# Patient Record
Sex: MALE | Race: WHITE
[De-identification: names, ages, dates, MRNs, and addresses within clinical notes are randomized per-mention and may not be internally consistent; named-entity substitution may affect disease eponyms.]

---

## 2020-03-14 ENCOUNTER — HOSPITAL ENCOUNTER (EMERGENCY)
Dept: HOSPITAL 95 - ER | Age: 55
Discharge: HOME | End: 2020-03-14
Payer: OTHER GOVERNMENT

## 2020-03-14 VITALS — BODY MASS INDEX: 31.86 KG/M2 | WEIGHT: 203 LBS | HEIGHT: 67 IN
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
## 2020-03-14 DIAGNOSIS — I10: ICD-10-CM

## 2020-03-14 DIAGNOSIS — F17.200: ICD-10-CM

## 2020-03-14 DIAGNOSIS — Z79.899: ICD-10-CM

## 2020-03-14 DIAGNOSIS — S52.502A: Primary | ICD-10-CM

## 2020-03-14 DIAGNOSIS — S00.81XA: ICD-10-CM

## 2020-03-14 LAB
ALBUMIN SERPL BCP-MCNC: 3.7 G/DL (ref 3.4–5)
ALBUMIN/GLOB SERPL: 0.9 {RATIO} (ref 0.8–1.8)
ALT SERPL W P-5'-P-CCNC: 52 U/L (ref 12–78)
ANION GAP SERPL CALCULATED.4IONS-SCNC: 12 MMOL/L (ref 6–16)
AST SERPL W P-5'-P-CCNC: 33 U/L (ref 12–37)
BASOPHILS # BLD AUTO: 0.05 K/MM3 (ref 0–0.23)
BASOPHILS NFR BLD AUTO: 1 % (ref 0–2)
BILIRUB SERPL-MCNC: 0.5 MG/DL (ref 0.1–1)
BUN SERPL-MCNC: 6 MG/DL (ref 8–24)
CALCIUM SERPL-MCNC: 8.2 MG/DL (ref 8.5–10.1)
CHLORIDE SERPL-SCNC: 100 MMOL/L (ref 98–108)
CO2 SERPL-SCNC: 23 MMOL/L (ref 21–32)
CREAT SERPL-MCNC: 0.67 MG/DL (ref 0.6–1.2)
DEPRECATED RDW RBC AUTO: 45.4 FL (ref 35.1–46.3)
EOSINOPHIL # BLD AUTO: 0.18 K/MM3 (ref 0–0.68)
EOSINOPHIL NFR BLD AUTO: 2 % (ref 0–6)
ERYTHROCYTE [DISTWIDTH] IN BLOOD BY AUTOMATED COUNT: 12.3 % (ref 11.7–14.2)
ETHANOL SERPL-MCNC: 292 MG/DL
GLOBULIN SER CALC-MCNC: 4.2 G/DL (ref 2.2–4)
GLUCOSE SERPL-MCNC: 104 MG/DL (ref 70–99)
HCT VFR BLD AUTO: 47.7 % (ref 37–53)
HGB BLD-MCNC: 16 G/DL (ref 13.5–17.5)
IMM GRANULOCYTES # BLD AUTO: 0.21 K/MM3 (ref 0–0.1)
IMM GRANULOCYTES NFR BLD AUTO: 2 % (ref 0–1)
LYMPHOCYTES # BLD AUTO: 1.71 K/MM3 (ref 0.84–5.2)
LYMPHOCYTES NFR BLD AUTO: 17 % (ref 21–46)
MCHC RBC AUTO-ENTMCNC: 33.5 G/DL (ref 31.5–36.5)
MCV RBC AUTO: 99 FL (ref 80–100)
MONOCYTES # BLD AUTO: 0.58 K/MM3 (ref 0.16–1.47)
MONOCYTES NFR BLD AUTO: 6 % (ref 4–13)
NEUTROPHILS # BLD AUTO: 7.47 K/MM3 (ref 1.96–9.15)
NEUTROPHILS NFR BLD AUTO: 73 % (ref 41–73)
NRBC # BLD AUTO: 0 K/MM3 (ref 0–0.02)
NRBC BLD AUTO-RTO: 0 /100 WBC (ref 0–0.2)
PLATELET # BLD AUTO: 261 K/MM3 (ref 150–400)
POTASSIUM SERPL-SCNC: 3.6 MMOL/L (ref 3.5–5.5)
PROT SERPL-MCNC: 7.9 G/DL (ref 6.4–8.2)
SODIUM SERPL-SCNC: 135 MMOL/L (ref 136–145)
TROPONIN I SERPL-MCNC: <0.015 NG/ML (ref 0–0.04)

## 2020-03-14 PROCEDURE — G0480 DRUG TEST DEF 1-7 CLASSES: HCPCS

## 2020-04-14 ENCOUNTER — HOSPITAL ENCOUNTER (OUTPATIENT)
Dept: HOSPITAL 95 - ORSCMMR | Age: 55
Discharge: HOME | End: 2020-04-14
Attending: ORTHOPAEDIC SURGERY
Payer: OTHER GOVERNMENT

## 2020-04-14 VITALS — BODY MASS INDEX: 27.1 KG/M2 | HEIGHT: 70.98 IN | WEIGHT: 193.57 LBS

## 2020-04-14 DIAGNOSIS — F17.210: ICD-10-CM

## 2020-04-14 DIAGNOSIS — I10: ICD-10-CM

## 2020-04-14 DIAGNOSIS — Z79.899: ICD-10-CM
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
## 2020-04-14 DIAGNOSIS — S52.502A: ICD-10-CM

## 2020-04-14 PROCEDURE — 0PSJ04Z REPOSITION LEFT RADIUS WITH INTERNAL FIXATION DEVICE, OPEN APPROACH: ICD-10-PCS | Performed by: ORTHOPAEDIC SURGERY

## 2020-04-14 PROCEDURE — C1713 ANCHOR/SCREW BN/BN,TIS/BN: HCPCS

## 2021-01-24 ENCOUNTER — HOSPITAL ENCOUNTER (OUTPATIENT)
Dept: HOSPITAL 95 - ER | Age: 56
Setting detail: OBSERVATION
LOS: 1 days | Discharge: HOME | End: 2021-01-25
Attending: HOSPITALIST | Admitting: HOSPITALIST
Payer: OTHER GOVERNMENT

## 2021-01-24 VITALS — BODY MASS INDEX: 26.7 KG/M2 | HEIGHT: 70.98 IN | WEIGHT: 190.7 LBS

## 2021-01-24 DIAGNOSIS — R13.10: ICD-10-CM

## 2021-01-24 DIAGNOSIS — L40.9: ICD-10-CM

## 2021-01-24 DIAGNOSIS — Z20.822: ICD-10-CM

## 2021-01-24 DIAGNOSIS — C09.9: Primary | ICD-10-CM

## 2021-01-24 DIAGNOSIS — I10: ICD-10-CM

## 2021-01-24 DIAGNOSIS — R09.81: ICD-10-CM

## 2021-01-24 DIAGNOSIS — D72.829: ICD-10-CM

## 2021-01-24 DIAGNOSIS — R42: ICD-10-CM

## 2021-01-24 DIAGNOSIS — R62.7: ICD-10-CM

## 2021-01-24 DIAGNOSIS — T50.2X5A: ICD-10-CM

## 2021-01-24 DIAGNOSIS — Z85.72: ICD-10-CM

## 2021-01-24 DIAGNOSIS — F17.210: ICD-10-CM

## 2021-01-24 DIAGNOSIS — E87.1: ICD-10-CM

## 2021-01-24 DIAGNOSIS — R51.9: ICD-10-CM

## 2021-01-24 LAB
ALBUMIN SERPL BCP-MCNC: 3.8 G/DL (ref 3.4–5)
ALBUMIN/GLOB SERPL: 0.9 {RATIO} (ref 0.8–1.8)
ALT SERPL W P-5'-P-CCNC: 35 U/L (ref 12–78)
ANION GAP SERPL CALCULATED.4IONS-SCNC: 9 MMOL/L (ref 6–16)
AST SERPL W P-5'-P-CCNC: 29 U/L (ref 12–37)
BASOPHILS # BLD AUTO: 0.11 K/MM3 (ref 0–0.23)
BASOPHILS NFR BLD AUTO: 1 % (ref 0–2)
BILIRUB SERPL-MCNC: 1.2 MG/DL (ref 0.1–1)
BUN SERPL-MCNC: 10 MG/DL (ref 8–24)
CALCIUM SERPL-MCNC: 9 MG/DL (ref 8.5–10.1)
CHLORIDE SERPL-SCNC: 97 MMOL/L (ref 98–108)
CO2 SERPL-SCNC: 29 MMOL/L (ref 21–32)
CREAT SERPL-MCNC: 0.77 MG/DL (ref 0.6–1.2)
DEPRECATED RDW RBC AUTO: 42.4 FL (ref 35.1–46.3)
EOSINOPHIL # BLD AUTO: 0.37 K/MM3 (ref 0–0.68)
EOSINOPHIL NFR BLD AUTO: 3 % (ref 0–6)
ERYTHROCYTE [DISTWIDTH] IN BLOOD BY AUTOMATED COUNT: 12 % (ref 11.7–14.2)
FLUAV RNA SPEC QL NAA+PROBE: NEGATIVE
FLUBV RNA SPEC QL NAA+PROBE: NEGATIVE
GLOBULIN SER CALC-MCNC: 4.1 G/DL (ref 2.2–4)
GLUCOSE SERPL-MCNC: 93 MG/DL (ref 70–99)
HCT VFR BLD AUTO: 47 % (ref 37–53)
HGB BLD-MCNC: 16.1 G/DL (ref 13.5–17.5)
IMM GRANULOCYTES # BLD AUTO: 0.07 K/MM3 (ref 0–0.1)
IMM GRANULOCYTES NFR BLD AUTO: 1 % (ref 0–1)
LYMPHOCYTES # BLD AUTO: 1.09 K/MM3 (ref 0.84–5.2)
LYMPHOCYTES NFR BLD AUTO: 7 % (ref 21–46)
MCHC RBC AUTO-ENTMCNC: 34.3 G/DL (ref 31.5–36.5)
MCV RBC AUTO: 95 FL (ref 80–100)
MONOCYTES # BLD AUTO: 1.4 K/MM3 (ref 0.16–1.47)
MONOCYTES NFR BLD AUTO: 9 % (ref 4–13)
NEUTROPHILS # BLD AUTO: 12.02 K/MM3 (ref 1.96–9.15)
NEUTROPHILS NFR BLD AUTO: 80 % (ref 41–73)
NRBC # BLD AUTO: 0 K/MM3 (ref 0–0.02)
NRBC BLD AUTO-RTO: 0 /100 WBC (ref 0–0.2)
PLATELET # BLD AUTO: 289 K/MM3 (ref 150–400)
POTASSIUM SERPL-SCNC: 3.5 MMOL/L (ref 3.5–5.5)
PROT SERPL-MCNC: 7.9 G/DL (ref 6.4–8.2)
RSV RNA SPEC QL NAA+PROBE: NEGATIVE
SARS-COV-2 RNA RESP QL NAA+PROBE: NEGATIVE
SODIUM SERPL-SCNC: 135 MMOL/L (ref 136–145)

## 2021-01-24 PROCEDURE — G0378 HOSPITAL OBSERVATION PER HR: HCPCS

## 2021-01-24 PROCEDURE — A9270 NON-COVERED ITEM OR SERVICE: HCPCS

## 2021-01-24 PROCEDURE — C1769 GUIDE WIRE: HCPCS

## 2021-01-24 NOTE — NUR
SHIFT SUMMARY
PT TAKEN BACK FOR PEG PLACEMENT TODAY. HOWEVER, PT IS UNABLE TO BREATHE
THROUGH HIS NOSTRILS AT THIS TIME, SO SURGERY WAS POSTPONED TILL TOMORROW WHEN
ANASTHESIA IS AVAILABLE. PT TOLERATING LIQUIDS AT THIS TIME. TO BE NPO AT
MIDNIGHT FOR PEG PLACEMENT. NO ACUTE CHANGES IN ASSESSMENT AT THIS TIME. VS
REVIEWED & STABLE. PT C/O CONSTANT HA. MD AWARE & MEDS GIVEN AS ORDERED TO
HELP. PT RESTING IN BED AT THIS TIME. CALL LIGHT IN REACH.

## 2021-01-25 LAB
ALBUMIN SERPL BCP-MCNC: 3.4 G/DL (ref 3.4–5)
ANION GAP SERPL CALCULATED.4IONS-SCNC: 6 MMOL/L (ref 6–16)
BASOPHILS # BLD AUTO: 0.1 K/MM3 (ref 0–0.23)
BASOPHILS NFR BLD AUTO: 1 % (ref 0–2)
BUN SERPL-MCNC: 13 MG/DL (ref 8–24)
CALCIUM SERPL-MCNC: 9 MG/DL (ref 8.5–10.1)
CHLORIDE SERPL-SCNC: 100 MMOL/L (ref 98–108)
CO2 SERPL-SCNC: 31 MMOL/L (ref 21–32)
CREAT SERPL-MCNC: 0.75 MG/DL (ref 0.6–1.2)
DEPRECATED RDW RBC AUTO: 42.5 FL (ref 35.1–46.3)
EOSINOPHIL # BLD AUTO: 0.47 K/MM3 (ref 0–0.68)
EOSINOPHIL NFR BLD AUTO: 5 % (ref 0–6)
ERYTHROCYTE [DISTWIDTH] IN BLOOD BY AUTOMATED COUNT: 12.1 % (ref 11.7–14.2)
GLUCOSE SERPL-MCNC: 101 MG/DL (ref 70–99)
HCT VFR BLD AUTO: 47.1 % (ref 37–53)
HGB BLD-MCNC: 16.1 G/DL (ref 13.5–17.5)
IMM GRANULOCYTES # BLD AUTO: 0.04 K/MM3 (ref 0–0.1)
IMM GRANULOCYTES NFR BLD AUTO: 0 % (ref 0–1)
LYMPHOCYTES # BLD AUTO: 1.31 K/MM3 (ref 0.84–5.2)
LYMPHOCYTES NFR BLD AUTO: 13 % (ref 21–46)
MCHC RBC AUTO-ENTMCNC: 34.2 G/DL (ref 31.5–36.5)
MCV RBC AUTO: 96 FL (ref 80–100)
MONOCYTES # BLD AUTO: 1.37 K/MM3 (ref 0.16–1.47)
MONOCYTES NFR BLD AUTO: 14 % (ref 4–13)
NEUTROPHILS # BLD AUTO: 6.77 K/MM3 (ref 1.96–9.15)
NEUTROPHILS NFR BLD AUTO: 67 % (ref 41–73)
NRBC # BLD AUTO: 0 K/MM3 (ref 0–0.02)
NRBC BLD AUTO-RTO: 0 /100 WBC (ref 0–0.2)
PHOSPHATE SERPL-MCNC: 3.9 MG/DL (ref 2.5–4.9)
PLATELET # BLD AUTO: 278 K/MM3 (ref 150–400)
POTASSIUM SERPL-SCNC: 3.9 MMOL/L (ref 3.5–5.5)
SODIUM SERPL-SCNC: 137 MMOL/L (ref 136–145)

## 2021-01-25 NOTE — NUR
DISCHARGE
DR VINNY LYONS D/C ORDERS. IV D/C INTACT. NO NEW SCRIPTS. PT DRESS/GATHER
BELONGINGS IND. D/C INSTRUCT REVIEWED w PT & WIFE, EMPHASIS ON PEG TUBE USE &
CARE, F/U APPTS, THEY VEBALIZE UNDERSTANDING. PT DECLINES W/C [facility] FORM
HOSP, PREFERS TO AMBULATE. PLEASANT, APPRECIATIVE AFFECT, STATES SATISFACTION.

## 2021-01-25 NOTE — NUR
History, Chart, Medications and Allergies reviewed before start of
procedure. Patient confirms NPO status and agrees with scheduled surgery.

## 2021-01-25 NOTE — NUR
01/25/21 1125 Norman Alcaraz
History, Chart, Medications and Allergies reviewed before start of
procedure.MONITOR INTACT WITH CONTINUOUS PULSE OXIMETRY AND
INTERMITTENT BP.3-LEAD EKG REVIEWED WITH PHYSICIAN PRIOR TO START OF
PROCEDURE.O2 VIA N/C INTACT THROUGHOUT SEDATION/PROCEDURE. See
Anesthesia record.

## 2021-01-25 NOTE — NUR
SHIFT SUMMARY
A/O, ABLE TO MAKE NEEDS KNOWN. COOPERATIVE WITH CARE. ANSWERS QUESTIONS
APPROPRIATELY. C/O PAIN/DISCOMFORT TO L HEAD/NECK; MEDICATED PER EMAR. ANXIOUS
TO HAVE PEG TUBE PLACED THIS DAY. REMAINED NPO SINCE 0000 EXCEPT MEDS AND ICE
CHIPS. ALSO WOULD REALLY LIKE TO MEET WITH PT AT SOME POINT TODAY. APPEARED TO
REST MUCH OF THE NIGHT. INDEPENDENT IN THE ROOM. NO ACUTE CHANGES NOTED. BED
REMAINED IN LOWEST POSITION. CALL LIGHT AND BELONGINGS WITHIN REACH. CONTINUE
WITH CURRENT PLAN OF CARE. REPORT TO ONCOMING RN.

## 2021-01-25 NOTE — NUR
DR NICOLAS ORDER FL DIET, ORDER OK TO USE PEG TUBE. DIETICIAN IN TO SEE PT
STATE HE HAS APPOINT @ [facility] TOMORROW w DIETICAIAN FOR PEG TUBE USE & TUBE
FEEDING ORDER.
PEG TUBE BASICS REVIEWED w PT & WIFE, TUBE FLUSHED w 60ML WATER. PT/WIFE
VERBALIZE UNDERSTANDING. PT STATE DESIRE FOR D/C HOME, DR CASILLAS NOTIFIED.

## 2021-02-18 ENCOUNTER — HOSPITAL ENCOUNTER (EMERGENCY)
Dept: HOSPITAL 95 - ER | Age: 56
LOS: 2 days | Discharge: HOME | End: 2021-02-20
Payer: OTHER GOVERNMENT

## 2021-02-18 VITALS — HEIGHT: 70 IN | BODY MASS INDEX: 26.92 KG/M2 | WEIGHT: 188.01 LBS

## 2021-02-18 DIAGNOSIS — C76.0: Primary | ICD-10-CM

## 2021-02-18 DIAGNOSIS — R51.9: ICD-10-CM

## 2021-02-23 ENCOUNTER — HOSPITAL ENCOUNTER (EMERGENCY)
Dept: HOSPITAL 95 - ER | Age: 56
Discharge: HOME | End: 2021-02-23
Payer: OTHER GOVERNMENT

## 2021-02-23 VITALS — HEIGHT: 71 IN | BODY MASS INDEX: 25.2 KG/M2 | WEIGHT: 180.01 LBS

## 2021-02-23 DIAGNOSIS — F17.210: ICD-10-CM

## 2021-02-23 DIAGNOSIS — R55: ICD-10-CM

## 2021-02-23 DIAGNOSIS — Z88.8: ICD-10-CM

## 2021-02-23 DIAGNOSIS — C76.0: ICD-10-CM

## 2021-02-23 DIAGNOSIS — I10: ICD-10-CM

## 2021-02-23 DIAGNOSIS — T40.2X1A: Primary | ICD-10-CM

## 2021-02-23 DIAGNOSIS — Z79.899: ICD-10-CM

## 2021-02-23 LAB
ALBUMIN SERPL BCP-MCNC: 3.5 G/DL (ref 3.4–5)
ALBUMIN/GLOB SERPL: 0.9 {RATIO} (ref 0.8–1.8)
ALT SERPL W P-5'-P-CCNC: 129 U/L (ref 12–78)
ANION GAP SERPL CALCULATED.4IONS-SCNC: 5 MMOL/L (ref 6–16)
AST SERPL W P-5'-P-CCNC: 68 U/L (ref 12–37)
BASOPHILS # BLD AUTO: 0.1 K/MM3 (ref 0–0.23)
BASOPHILS NFR BLD AUTO: 1 % (ref 0–2)
BILIRUB SERPL-MCNC: 1.9 MG/DL (ref 0.1–1)
BUN SERPL-MCNC: 15 MG/DL (ref 8–24)
CALCIUM SERPL-MCNC: 8.6 MG/DL (ref 8.5–10.1)
CHLORIDE SERPL-SCNC: 103 MMOL/L (ref 98–108)
CO2 SERPL-SCNC: 30 MMOL/L (ref 21–32)
CREAT SERPL-MCNC: 0.76 MG/DL (ref 0.6–1.2)
DEPRECATED RDW RBC AUTO: 41.6 FL (ref 35.1–46.3)
EOSINOPHIL # BLD AUTO: 0.18 K/MM3 (ref 0–0.68)
EOSINOPHIL NFR BLD AUTO: 2 % (ref 0–6)
ERYTHROCYTE [DISTWIDTH] IN BLOOD BY AUTOMATED COUNT: 11.8 % (ref 11.7–14.2)
GLOBULIN SER CALC-MCNC: 3.8 G/DL (ref 2.2–4)
GLUCOSE SERPL-MCNC: 124 MG/DL (ref 70–99)
HCT VFR BLD AUTO: 45.2 % (ref 37–53)
HGB BLD-MCNC: 15.5 G/DL (ref 13.5–17.5)
IMM GRANULOCYTES # BLD AUTO: 0.05 K/MM3 (ref 0–0.1)
IMM GRANULOCYTES NFR BLD AUTO: 0 % (ref 0–1)
LYMPHOCYTES # BLD AUTO: 0.81 K/MM3 (ref 0.84–5.2)
LYMPHOCYTES NFR BLD AUTO: 7 % (ref 21–46)
MCHC RBC AUTO-ENTMCNC: 34.3 G/DL (ref 31.5–36.5)
MCV RBC AUTO: 95 FL (ref 80–100)
MONOCYTES # BLD AUTO: 0.73 K/MM3 (ref 0.16–1.47)
MONOCYTES NFR BLD AUTO: 6 % (ref 4–13)
NEUTROPHILS # BLD AUTO: 10.14 K/MM3 (ref 1.96–9.15)
NEUTROPHILS NFR BLD AUTO: 85 % (ref 41–73)
NRBC # BLD AUTO: 0 K/MM3 (ref 0–0.02)
NRBC BLD AUTO-RTO: 0 /100 WBC (ref 0–0.2)
PLATELET # BLD AUTO: 309 K/MM3 (ref 150–400)
POTASSIUM SERPL-SCNC: 4 MMOL/L (ref 3.5–5.5)
PROT SERPL-MCNC: 7.3 G/DL (ref 6.4–8.2)
SODIUM SERPL-SCNC: 138 MMOL/L (ref 136–145)
TROPONIN I SERPL-MCNC: <0.015 NG/ML (ref 0–0.04)

## 2021-02-26 ENCOUNTER — HOSPITAL ENCOUNTER (OUTPATIENT)
Dept: HOSPITAL 95 - ATC | Age: 56
Discharge: HOME | End: 2021-02-26
Attending: INTERNAL MEDICINE
Payer: OTHER GOVERNMENT

## 2021-02-26 DIAGNOSIS — R11.0: ICD-10-CM

## 2021-02-26 DIAGNOSIS — G89.3: ICD-10-CM

## 2021-02-26 DIAGNOSIS — C09.8: Primary | ICD-10-CM

## 2021-02-26 DIAGNOSIS — R13.10: ICD-10-CM

## 2021-02-26 DIAGNOSIS — I10: ICD-10-CM

## 2021-02-26 DIAGNOSIS — F17.210: ICD-10-CM

## 2021-02-27 ENCOUNTER — HOSPITAL ENCOUNTER (EMERGENCY)
Dept: HOSPITAL 95 - ER | Age: 56
Discharge: HOME | End: 2021-02-27
Payer: OTHER GOVERNMENT

## 2021-02-27 VITALS — HEIGHT: 69 IN | WEIGHT: 184.99 LBS | BODY MASS INDEX: 27.4 KG/M2

## 2021-02-27 DIAGNOSIS — R51.9: Primary | ICD-10-CM

## 2021-02-27 DIAGNOSIS — F17.210: ICD-10-CM

## 2021-02-27 DIAGNOSIS — Z79.899: ICD-10-CM

## 2021-11-19 ENCOUNTER — HOSPITAL ENCOUNTER (OUTPATIENT)
Dept: HOSPITAL 95 - ORSCMMR | Age: 56
Discharge: HOME | End: 2021-11-19
Attending: SURGERY
Payer: OTHER GOVERNMENT

## 2021-11-19 VITALS — WEIGHT: 134.04 LBS | HEIGHT: 71 IN | BODY MASS INDEX: 18.77 KG/M2

## 2021-11-19 DIAGNOSIS — C09.8: Primary | ICD-10-CM

## 2021-11-19 DIAGNOSIS — K21.9: ICD-10-CM

## 2021-11-19 DIAGNOSIS — Z79.899: ICD-10-CM

## 2021-11-19 DIAGNOSIS — I10: ICD-10-CM

## 2021-11-19 DIAGNOSIS — F17.210: ICD-10-CM

## 2021-11-19 LAB
ANION GAP SERPL CALCULATED.4IONS-SCNC: 11 MMOL/L (ref 6–16)
BUN SERPL-MCNC: 5 MG/DL (ref 8–24)
CALCIUM SERPL-MCNC: 9.3 MG/DL (ref 8.5–10.1)
CHLORIDE SERPL-SCNC: 94 MMOL/L (ref 98–108)
CO2 SERPL-SCNC: 28 MMOL/L (ref 21–32)
CREAT SERPL-MCNC: 0.49 MG/DL (ref 0.6–1.2)
GLUCOSE SERPL-MCNC: 91 MG/DL (ref 70–99)
POTASSIUM SERPL-SCNC: 4.4 MMOL/L (ref 3.5–5.5)
SODIUM SERPL-SCNC: 133 MMOL/L (ref 136–145)

## 2021-11-19 PROCEDURE — A9270 NON-COVERED ITEM OR SERVICE: HCPCS

## 2021-11-19 PROCEDURE — C1788 PORT, INDWELLING, IMP: HCPCS

## 2021-11-19 PROCEDURE — 05HM33Z INSERTION OF INFUSION DEVICE INTO RIGHT INTERNAL JUGULAR VEIN, PERCUTANEOUS APPROACH: ICD-10-PCS | Performed by: SURGERY

## 2021-11-19 PROCEDURE — B543ZZA ULTRASONOGRAPHY OF RIGHT JUGULAR VEINS, GUIDANCE: ICD-10-PCS | Performed by: SURGERY

## 2021-11-19 NOTE — NUR
Ambulatory in Day Surgery. PT HAS BANDAID OVER CENTER CHEST STATES HIS "BIOPSY
AREA WAS LEAKING SO I COVERED IT".
History, Chart, Medications and Allergies reviewed before start of
procedure.Patient confirms NPO status and agrees with scheduled surgery.
Patient reports completing Chlorhexadine shower X2 prior to admission to
hospital.Lungs clear T/O to Auscultation.
Patient States Post-Procedure ride home has been arranged WITH WIFE.

## 2021-11-19 NOTE — NUR
Patient up to Ambulate independently. Gait steady.
Discharge instructions reviewed with patient. Patient verbalizes understanding.
Copy given to patient to take home.
Discharged via wheelchair to private car for ride home WITH WIFE. MAGDALENALEX CALLED
INTO [facility]. PATIENT HAS OWN PAIN AND NAUSEA MEDICATION AT HOME.
DENIES QUESTIONS OR CONCERNS.

## 2022-08-08 ENCOUNTER — HOSPITAL ENCOUNTER (INPATIENT)
Dept: HOSPITAL 95 - ER | Age: 57
LOS: 16 days | DRG: 871 | End: 2022-08-24
Attending: INTERNAL MEDICINE | Admitting: HOSPITALIST
Payer: OTHER GOVERNMENT

## 2022-08-08 VITALS — WEIGHT: 130.07 LBS | HEIGHT: 70 IN | BODY MASS INDEX: 18.62 KG/M2

## 2022-08-08 DIAGNOSIS — C76.0: ICD-10-CM

## 2022-08-08 DIAGNOSIS — Z78.1: ICD-10-CM

## 2022-08-08 DIAGNOSIS — Z79.899: ICD-10-CM

## 2022-08-08 DIAGNOSIS — E87.6: ICD-10-CM

## 2022-08-08 DIAGNOSIS — J69.0: ICD-10-CM

## 2022-08-08 DIAGNOSIS — R65.20: ICD-10-CM

## 2022-08-08 DIAGNOSIS — Z87.891: ICD-10-CM

## 2022-08-08 DIAGNOSIS — R13.12: ICD-10-CM

## 2022-08-08 DIAGNOSIS — Z86.16: ICD-10-CM

## 2022-08-08 DIAGNOSIS — Z85.72: ICD-10-CM

## 2022-08-08 DIAGNOSIS — E83.39: ICD-10-CM

## 2022-08-08 DIAGNOSIS — Z88.8: ICD-10-CM

## 2022-08-08 DIAGNOSIS — J96.01: ICD-10-CM

## 2022-08-08 DIAGNOSIS — E83.51: ICD-10-CM

## 2022-08-08 DIAGNOSIS — J18.9: ICD-10-CM

## 2022-08-08 DIAGNOSIS — Z20.822: ICD-10-CM

## 2022-08-08 DIAGNOSIS — Z51.5: ICD-10-CM

## 2022-08-08 DIAGNOSIS — Z79.891: ICD-10-CM

## 2022-08-08 DIAGNOSIS — E44.0: ICD-10-CM

## 2022-08-08 DIAGNOSIS — Z82.49: ICD-10-CM

## 2022-08-08 DIAGNOSIS — C79.89: ICD-10-CM

## 2022-08-08 DIAGNOSIS — A41.9: Primary | ICD-10-CM

## 2022-08-08 DIAGNOSIS — I10: ICD-10-CM

## 2022-08-08 DIAGNOSIS — Z92.21: ICD-10-CM

## 2022-08-08 DIAGNOSIS — Z92.3: ICD-10-CM

## 2022-08-08 DIAGNOSIS — Z98.890: ICD-10-CM

## 2022-08-08 DIAGNOSIS — E87.1: ICD-10-CM

## 2022-08-08 LAB
ALBUMIN SERPL BCP-MCNC: 2.2 G/DL (ref 3.4–5)
ALBUMIN/GLOB SERPL: 0.6 {RATIO} (ref 0.8–1.8)
ALT SERPL W P-5'-P-CCNC: 17 U/L (ref 12–78)
ANION GAP SERPL CALCULATED.4IONS-SCNC: 7 MMOL/L (ref 6–16)
AST SERPL W P-5'-P-CCNC: 29 U/L (ref 12–37)
BASOPHILS # BLD AUTO: 0.06 K/MM3 (ref 0–0.23)
BASOPHILS NFR BLD AUTO: 0 % (ref 0–2)
BILIRUB SERPL-MCNC: 0.5 MG/DL (ref 0.1–1)
BUN SERPL-MCNC: 7 MG/DL (ref 8–24)
CALCIUM SERPL-MCNC: 8.1 MG/DL (ref 8.5–10.1)
CHLORIDE SERPL-SCNC: 96 MMOL/L (ref 98–108)
CO2 SERPL-SCNC: 27 MMOL/L (ref 21–32)
CREAT SERPL-MCNC: 0.52 MG/DL (ref 0.6–1.2)
DEPRECATED RDW RBC AUTO: 41.2 FL (ref 35.1–46.3)
EOSINOPHIL # BLD AUTO: 0.11 K/MM3 (ref 0–0.68)
EOSINOPHIL NFR BLD AUTO: 1 % (ref 0–6)
ERYTHROCYTE [DISTWIDTH] IN BLOOD BY AUTOMATED COUNT: 12 % (ref 11.7–14.2)
FLUAV RNA SPEC QL NAA+PROBE: NEGATIVE
FLUBV RNA SPEC QL NAA+PROBE: NEGATIVE
GLOBULIN SER CALC-MCNC: 4 G/DL (ref 2.2–4)
GLUCOSE SERPL-MCNC: 100 MG/DL (ref 70–99)
HCT VFR BLD AUTO: 37.9 % (ref 37–53)
HGB BLD-MCNC: 13.7 G/DL (ref 13.5–17.5)
IMM GRANULOCYTES # BLD AUTO: 0.17 K/MM3 (ref 0–0.1)
IMM GRANULOCYTES NFR BLD AUTO: 1 % (ref 0–1)
LYMPHOCYTES # BLD AUTO: 0.48 K/MM3 (ref 0.84–5.2)
LYMPHOCYTES NFR BLD AUTO: 3 % (ref 21–46)
MCHC RBC AUTO-ENTMCNC: 36.1 G/DL (ref 31.5–36.5)
MCV RBC AUTO: 93 FL (ref 80–100)
MONOCYTES # BLD AUTO: 1.95 K/MM3 (ref 0.16–1.47)
MONOCYTES NFR BLD AUTO: 13 % (ref 4–13)
NEUTROPHILS # BLD AUTO: 11.88 K/MM3 (ref 1.96–9.15)
NEUTROPHILS NFR BLD AUTO: 81 % (ref 41–73)
NRBC # BLD AUTO: 0 K/MM3 (ref 0–0.02)
NRBC BLD AUTO-RTO: 0 /100 WBC (ref 0–0.2)
PLATELET # BLD AUTO: 413 K/MM3 (ref 150–400)
POTASSIUM SERPL-SCNC: 3.5 MMOL/L (ref 3.5–5.5)
PROT SERPL-MCNC: 6.2 G/DL (ref 6.4–8.2)
PROTHROMBIN TIME: 10.9 SEC (ref 9.7–11.5)
RSV RNA SPEC QL NAA+PROBE: NEGATIVE
SARS-COV-2 RNA RESP QL NAA+PROBE: NEGATIVE
SODIUM SERPL-SCNC: 130 MMOL/L (ref 136–145)

## 2022-08-08 PROCEDURE — 3E03329 INTRODUCTION OF OTHER ANTI-INFECTIVE INTO PERIPHERAL VEIN, PERCUTANEOUS APPROACH: ICD-10-PCS | Performed by: INTERNAL MEDICINE

## 2022-08-08 PROCEDURE — A9270 NON-COVERED ITEM OR SERVICE: HCPCS

## 2022-08-08 PROCEDURE — C1751 CATH, INF, PER/CENT/MIDLINE: HCPCS

## 2022-08-09 LAB
ALBUMIN SERPL BCP-MCNC: 1.9 G/DL (ref 3.4–5)
ALBUMIN/GLOB SERPL: 0.5 {RATIO} (ref 0.8–1.8)
ALT SERPL W P-5'-P-CCNC: 14 U/L (ref 12–78)
ANION GAP SERPL CALCULATED.4IONS-SCNC: 7 MMOL/L (ref 6–16)
AST SERPL W P-5'-P-CCNC: 24 U/L (ref 12–37)
BASOPHILS # BLD AUTO: 0.09 K/MM3 (ref 0–0.23)
BASOPHILS NFR BLD AUTO: 1 % (ref 0–2)
BILIRUB SERPL-MCNC: 0.7 MG/DL (ref 0.1–1)
BUN SERPL-MCNC: 6 MG/DL (ref 8–24)
CALCIUM SERPL-MCNC: 7.4 MG/DL (ref 8.5–10.1)
CHLORIDE SERPL-SCNC: 101 MMOL/L (ref 98–108)
CO2 SERPL-SCNC: 27 MMOL/L (ref 21–32)
CREAT SERPL-MCNC: 0.55 MG/DL (ref 0.6–1.2)
DEPRECATED RDW RBC AUTO: 42.6 FL (ref 35.1–46.3)
EOSINOPHIL # BLD AUTO: 0.24 K/MM3 (ref 0–0.68)
EOSINOPHIL NFR BLD AUTO: 2 % (ref 0–6)
ERYTHROCYTE [DISTWIDTH] IN BLOOD BY AUTOMATED COUNT: 12.2 % (ref 11.7–14.2)
GLOBULIN SER CALC-MCNC: 3.6 G/DL (ref 2.2–4)
GLUCOSE SERPL-MCNC: 99 MG/DL (ref 70–99)
HCT VFR BLD AUTO: 39.5 % (ref 37–53)
HGB BLD-MCNC: 13.9 G/DL (ref 13.5–17.5)
IMM GRANULOCYTES # BLD AUTO: 0.15 K/MM3 (ref 0–0.1)
IMM GRANULOCYTES NFR BLD AUTO: 1 % (ref 0–1)
LYMPHOCYTES # BLD AUTO: 0.34 K/MM3 (ref 0.84–5.2)
LYMPHOCYTES NFR BLD AUTO: 3 % (ref 21–46)
MCHC RBC AUTO-ENTMCNC: 35.2 G/DL (ref 31.5–36.5)
MCV RBC AUTO: 95 FL (ref 80–100)
MONOCYTES # BLD AUTO: 1.93 K/MM3 (ref 0.16–1.47)
MONOCYTES NFR BLD AUTO: 14 % (ref 4–13)
NEUTROPHILS # BLD AUTO: 10.72 K/MM3 (ref 1.96–9.15)
NEUTROPHILS NFR BLD AUTO: 80 % (ref 41–73)
NRBC # BLD AUTO: 0 K/MM3 (ref 0–0.02)
NRBC BLD AUTO-RTO: 0 /100 WBC (ref 0–0.2)
PLATELET # BLD AUTO: 396 K/MM3 (ref 150–400)
POTASSIUM SERPL-SCNC: 3.6 MMOL/L (ref 3.5–5.5)
PROT SERPL-MCNC: 5.5 G/DL (ref 6.4–8.2)
SODIUM SERPL-SCNC: 135 MMOL/L (ref 136–145)

## 2022-08-09 NOTE — NUR
No other significant changes with patient. He was up to bathroom on RA and
sats dropped to 88% and when back to bed came up above 90% quickly. VSS no
other changes.

## 2022-08-09 NOTE — NUR
Recieved report from Noc RN. Patient is sitting up in bed awake and is able to
communicate his needs. he is on 2L via NC and sats 97% at rest. He has RAC
18ga IV with NS at 100 ml/hr. He uses urinal appropriately and has clear
yellow urine output and there was 300 mls. I placed him on RA and has been
satting 93-95%.He is NPO until ST evals. REYES but weak.

## 2022-08-09 NOTE — NUR
Patient has been resting awake and talking on the phone. He remains on RA and
sats in the mid 90%'s. He has been tolerating liquids and Mech soft diet
well. He has been up to bathroom as well as using urinal.

## 2022-08-09 NOTE — NUR
SHIFT SUMMARY
 
PT ADMITTED TO UNIT. AXO. ON 4LNC. IN SR. VSS. PT WALKED INTO BED. NO ASSIST.
CONTINENT. COARSE LUNG SOUUNDS T/O. NPO FOR SPEECH THERAPY. ADMISSION
COMPLETED. PT HAS BEEN TITRATED DOWN TO 2LNC, OCCASIONALLY DESATS WITH
ACTIVITY BUT AT REST REMAINS >94%. OTHERWISE, PT RESTING IN ROOM QUIETELY.

## 2022-08-09 NOTE — NUR
Patient remains alert and oriented and is able to communicate his needs. He
calls appropriately when needing bathroom. He is on RA and at rest sats mid to
upper 90%'s and when up may drop to 88%. He has a RAC 18ga IV that is
infusingNS TKO and intermitent ABX that will stop tonite, see [email or web address]. He
has tolerated intake per order. VSS. He is independent with care.

## 2022-08-09 NOTE — NUR
ST by and passed for [facility] soft and talked with him about outside ST as well.
He is tolerating ice water without difficulty. He remains on RA with out
activity in bed and sats >90%. Dr Tejeda has made him Med no tele.

## 2022-08-10 NOTE — NUR
SHIFT SUMMARY:
 
PT. IS A&OX4, AMBULATES W/ SBA, BUT DESATURATES DOWN INTO THE 80'S W/
ACTIVITY. UPON THE BEGINING OF THE SHIFT, THE PT. WAS SET TO GO HOME AFTER RT.
PREFROMS A HOME O2 EVALUATION. THE PT. FAILED THE STUDY AND DESATURATED TO 78%
FROM JUST STANDING AT THE BEDSIDE. THE PT. REQUIRES 15L HIGH FLOW NC, TO HELP
REGAIN O2 > 90%. AFTER WORKING W/ PT, THE PATIENT WAS SUSTAINING IN WITH
THE SPO2 IN THE 80'S AND REQUIRED A NONREBREATHER AT 15 L AND HIGH FLOW NC AT
10 L, TO SUSTAIN SPO2 > 90'S.  CALLED, AND PT. HAD BIPAP ON STAND BY DUE TO
INCREASE OF O2 DEMAND, AND IS PUT BACK AS PCU STATUS. PT. HAD A CT. SCAN TO
AND RESULTS ARE PENDING. PT. IS CALM, BUT GETS TACHYPENIC. TELEMETRY PLACED ON
PT. AND WILL CONTINUE TO MONITOR.

## 2022-08-10 NOTE — NUR
NIGHT SHIFT SUMMARY
PT IS ALERT AND ORIENTED COMMUNICATING APPROPRIATELY W STAFF. PT WAS ABLE TO
MAINTAIN O2 SATS >90% ON RM AIR WHEN LYING IN BED HOWEVER, THE PT'S O2 SATS
WOULD DROP TO THE LOW 80'S WHEN HE WAS AMBULATING REQUIRING 2-4L NC TO
RECOVER. PT REMAINED ON 2L NC WHILE HE SLEPT W O2 SATS 90%-92% ALL NIGHT. PT'S
BP WNL AND STABLE THIS SHIFT. PT AFEBRILE THIS SHIFT. PT WAS ABLE TO SLEEP
COMFORTABLY FOR MOST OF THE NIGHT W THE CALL LIGHT WITHIN REACH. WILL REPORT
TOP ONCOMING RN.

## 2022-08-11 LAB
ALBUMIN SERPL BCP-MCNC: 1.6 G/DL (ref 3.4–5)
ALBUMIN/GLOB SERPL: 0.5 {RATIO} (ref 0.8–1.8)
ALT SERPL W P-5'-P-CCNC: 16 U/L (ref 12–78)
ANION GAP SERPL CALCULATED.4IONS-SCNC: 6 MMOL/L (ref 6–16)
AST SERPL W P-5'-P-CCNC: 26 U/L (ref 12–37)
BASOPHILS # BLD AUTO: 0.08 K/MM3 (ref 0–0.23)
BASOPHILS NFR BLD AUTO: 1 % (ref 0–2)
BILIRUB SERPL-MCNC: 0.6 MG/DL (ref 0.1–1)
BUN SERPL-MCNC: 6 MG/DL (ref 8–24)
CALCIUM SERPL-MCNC: 7.3 MG/DL (ref 8.5–10.1)
CHLORIDE SERPL-SCNC: 101 MMOL/L (ref 98–108)
CO2 SERPL-SCNC: 28 MMOL/L (ref 21–32)
CREAT SERPL-MCNC: 0.58 MG/DL (ref 0.6–1.2)
DEPRECATED RDW RBC AUTO: 43 FL (ref 35.1–46.3)
EOSINOPHIL # BLD AUTO: 0.14 K/MM3 (ref 0–0.68)
EOSINOPHIL NFR BLD AUTO: 1 % (ref 0–6)
ERYTHROCYTE [DISTWIDTH] IN BLOOD BY AUTOMATED COUNT: 12.4 % (ref 11.7–14.2)
GLOBULIN SER CALC-MCNC: 3.5 G/DL (ref 2.2–4)
GLUCOSE SERPL-MCNC: 106 MG/DL (ref 70–99)
HCT VFR BLD AUTO: 36.3 % (ref 37–53)
HGB BLD-MCNC: 12.5 G/DL (ref 13.5–17.5)
IMM GRANULOCYTES # BLD AUTO: 0.12 K/MM3 (ref 0–0.1)
IMM GRANULOCYTES NFR BLD AUTO: 1 % (ref 0–1)
LYMPHOCYTES # BLD AUTO: 0.42 K/MM3 (ref 0.84–5.2)
LYMPHOCYTES NFR BLD AUTO: 3 % (ref 21–46)
MCHC RBC AUTO-ENTMCNC: 34.4 G/DL (ref 31.5–36.5)
MCV RBC AUTO: 95 FL (ref 80–100)
MONOCYTES # BLD AUTO: 2.24 K/MM3 (ref 0.16–1.47)
MONOCYTES NFR BLD AUTO: 16 % (ref 4–13)
NEUTROPHILS # BLD AUTO: 10.67 K/MM3 (ref 1.96–9.15)
NEUTROPHILS NFR BLD AUTO: 78 % (ref 41–73)
NRBC # BLD AUTO: 0 K/MM3 (ref 0–0.02)
NRBC BLD AUTO-RTO: 0 /100 WBC (ref 0–0.2)
PLATELET # BLD AUTO: 414 K/MM3 (ref 150–400)
POTASSIUM SERPL-SCNC: 3.8 MMOL/L (ref 3.5–5.5)
PROT SERPL-MCNC: 5.1 G/DL (ref 6.4–8.2)
SODIUM SERPL-SCNC: 135 MMOL/L (ref 136–145)

## 2022-08-11 PROCEDURE — 5A0955A ASSISTANCE WITH RESPIRATORY VENTILATION, GREATER THAN 96 CONSECUTIVE HOURS, HIGH NASAL FLOW/VELOCITY: ICD-10-PCS | Performed by: INTERNAL MEDICINE

## 2022-08-11 NOTE — NUR
UPDATE
PT WAS UP TO BEDSIDE FOR URINAL USE. PT ON HFNC 60L/50%, SATS DROPPED TO HIGH
80'S ONVE PT WAS TRYING TO GET BACK INTO BED AFTER ELIMINATION. FIO2 BUMPED
TO 1005 FOR 2 MINUTES DUE TO PT REPORTING FEELING SOB. PT ON HIS FEET FOR
ROUGHLY 4 MIN.

## 2022-08-11 NOTE — NUR
UPON ASSUMPTION OF CARE. MR. FRANCO WAS ON THE BIPAP WITH SETTINGS OF 12/8 AT
45%. LATER IN THE NIGHT. AN ATTEMPT WAS MADE TO GO BACK TO NASAL CANNULA AT
HIS PREVIOUS 10 LPM. THIS FAILED, PATIENT COULD NOT MAINTAIN AND OXYGEN
SATURATION GREATER THAN 90% OF AS MUCH AS 15 LPM. BIPAP WAS PLACED BACK ON THE
PATIENT. IN THE 2300 HOUR, OUR RESPIRATORY THERAPIST REMOVED THE BIPAP AS
PATIENT DENIED SHORTNESS OF BRATH AND REPORTED TO THE RT THAT HIS OXYGEN
LEVELS DROP ONLY WITH ACTIVITY AND NOT WHILE AT REST. INITIALLY, PATIENT WAS
ON 10 LPM AND EVENTUALLY LANDED ON 14 LPM AS THE SETTING WHERE HE WAS ABLE TO
MAINTAIN HIS OXYGEN SATURATIONS AT 92 OR GREATER.
 
ACTIVITY AS MINIMAL AS SITTING UP IN BED CAUSES MR. FRANCO'S OXYGEN LEVELS TO
DROP INTO THE 82 - 85 PERCENTILE RANGE.  MR. FRANCO REMAINED ON 14 LPM FOR THE
REMAINDER OF THE NIGHT.

## 2022-08-11 NOTE — NUR
SHIFT SUMMARY
PT A/O X4 AND COOPERATIVE OF CARE. PT ON 14L NC THIS AM, SATS IN THE LOW 90'S.
PT WOULD DESAT QUICKLY TO THE HIGH 70'S WHEN STANDING AT EDGE OF BED. RT
SWITCHED PT TO HFNC AT 60L/ 50%, PT SATS IN THE HIGH 90;S ON NEW SETTINGS. PT
WOULD DESAT TO THE HIGH 80'S WHILE ON NEW SETTINGS AND RETURN TO 90'S QUICKLY
WHEN AT REST. HFNC TITRATED TO 50L/50% BY RT, PT SATS MAINTAINING IN HIGH
90'S. PT RESPIRATIONS SLIGHTLY TACHY DURING SHIFT.OTHER VSS THROUGHOUT SHIFT.
NO REPORT OF CHEST PAIN/PRESSURE THROUGHOUT SHIFT. PT DOES REPORT SOB WHEN
STANDING FOR A FEW MINUTES AT A TIME, STATES "WHEN I TAKE A DEEP BREATH I FEEL
LIKE I COULD STILL TAKE IN A DEEPER BREATH BUT CAN'T." PT TEMP WASS LITTLE
ELEVATED TOWARDS END OF SHIFT, TYLENOL GIVEN PER EMAR. PT CALLS APPROPIATE AND
FOLLOWS INSTRUCTIONS WELL. PT WORKED [facility] PHYSICAL THERAPY, SEE THERAPY NOTES.

## 2022-08-12 LAB
ANION GAP SERPL CALCULATED.4IONS-SCNC: 6 MMOL/L (ref 6–16)
BUN SERPL-MCNC: 7 MG/DL (ref 8–24)
CALCIUM SERPL-MCNC: 7.2 MG/DL (ref 8.5–10.1)
CHLORIDE SERPL-SCNC: 101 MMOL/L (ref 98–108)
CO2 SERPL-SCNC: 27 MMOL/L (ref 21–32)
CREAT SERPL-MCNC: 0.41 MG/DL (ref 0.6–1.2)
DEPRECATED RDW RBC AUTO: 41.8 FL (ref 35.1–46.3)
ERYTHROCYTE [DISTWIDTH] IN BLOOD BY AUTOMATED COUNT: 12.1 % (ref 11.7–14.2)
GLUCOSE SERPL-MCNC: 121 MG/DL (ref 70–99)
HCT VFR BLD AUTO: 37.4 % (ref 37–53)
HGB BLD-MCNC: 13.2 G/DL (ref 13.5–17.5)
MCHC RBC AUTO-ENTMCNC: 35.3 G/DL (ref 31.5–36.5)
MCV RBC AUTO: 94 FL (ref 80–100)
NRBC # BLD AUTO: 0 K/MM3 (ref 0–0.02)
NRBC BLD AUTO-RTO: 0 /100 WBC (ref 0–0.2)
PLATELET # BLD AUTO: 400 K/MM3 (ref 150–400)
POTASSIUM SERPL-SCNC: 3.9 MMOL/L (ref 3.5–5.5)
SODIUM SERPL-SCNC: 134 MMOL/L (ref 136–145)

## 2022-08-12 NOTE — NUR
SHIFT SUMMARY
PT A/O X 4 AND COOPERATIVE OF CARE. PT SLIGHTLY FEBRILE DURING SHIFT AND
REPORTED HEADACHES, TREATED PER EMAR. PT WAS UP TO BEDSIDE FOR USE OF URINAL
MULTIPLE TIMES, SATS DROPPED INTO MID 80'S ON HFNC 45L/35%, FIO2 RAISED TO
100% FOR 2MNUTES FOR RECOVERY AS NEEDED. PT DID REPORT PERIODS OF SOB WHEN UP
IN ROOM. OTHER VSS THROUGHOUT SHIFT. PT HAD BARIUM SWALLOW PERFORMED, SHOWED
THAT PT ASPIRATES ON THIN LIQUIDS, SEE BARIUM SWALLOW NOTES. NO REPORT OF
CHEST PAIN/PRESSURE THROUGHOUT SHIFT. PT CALS APPROPIATE AND VERBALIZES
UNDERSTANDING OF SPEECH THERAPIES INSTRUCTIONS, SEE BARIUM SWALLOW NOTES.

## 2022-08-13 NOTE — NUR
SHIFT SUMMARY
PT ALERT AND ORIENTED X4. THERE HAVE BEEN NO ACUTE CHANGES T/O THE SHIFT.
VITLAS ARE STABLE. PT REMAINS ON 40L 35% ON AIRVO WITH SATS ABOVE 90%. HE DOSE
DESAT WITH EXERTION. PT DENIES CHEST PAIN/PRESSURE. PT IS ABLE TO STAND UP AT
BEDSIDE TO USE URINAL WITH SBA. USES CALL LIGHT APPROPRIETLY. CALL LIGHT IS
WITHIN REACH.

## 2022-08-13 NOTE — NUR
SHIFT SUMMARY
PT A/O X4 AND COOPERATIVE OF CARE. PT O2 SATS STILL DROPPING TO 80'S WITH
EXERTION ON CURRENT HFNC 40L/35% FIO2. OTHER VSS STABLE THROUGHOUT SHIFT. PT
NOT REPORTING SOB EVEN WITH SATS DROPPING TO 80'S. PT SATS TEND TO DROP WHILE
PT IS EATING DUE TO PT HOLD BREATH WHILE CHEWING AND SLOWLY TUCKING CHIN AND
SWALLOWING. SPEECH THRAPIST CHANGED DIET TO PUREE, PT "NOT CRAZY ABOUT PUREED
FOOD." PT HAS BEEN DRINKING ENSURES FROM [employer]. PT NEEDING FREQUENT
REMINDERS TO TAKE SMALL SIPS AND TUCK CHIN WHILE DRINKING. PT WAS UP TO USE
URINAL AT BEDSIDE MULTIPLE TIE DURING SHIFT, SATS HOLD STABLE IN 90'S UNTIL PT
BEGINS CLIMBING BACK INTO BED, SATS DROP TO MID 80'S. RT ATTEMPTED TO HAVE PT
SWITH TO NC AT 6-10L, PT DID NOT TOLERATE AND WAS SWITHED BACK TO HIGH FLOW.
PT WORKED WITH PHYSICAL THERAPY, DESATED WHEN SITTING AT EDGE OF BED, SEE
THERAPIST'S NOTE.

## 2022-08-14 LAB
ANION GAP SERPL CALCULATED.4IONS-SCNC: 5 MMOL/L (ref 6–16)
BUN SERPL-MCNC: 7 MG/DL (ref 8–24)
CALCIUM SERPL-MCNC: 8.1 MG/DL (ref 8.5–10.1)
CHLORIDE SERPL-SCNC: 101 MMOL/L (ref 98–108)
CO2 SERPL-SCNC: 30 MMOL/L (ref 21–32)
CREAT SERPL-MCNC: 0.58 MG/DL (ref 0.6–1.2)
DEPRECATED RDW RBC AUTO: 43.3 FL (ref 35.1–46.3)
ERYTHROCYTE [DISTWIDTH] IN BLOOD BY AUTOMATED COUNT: 12.4 % (ref 11.7–14.2)
GLUCOSE SERPL-MCNC: 105 MG/DL (ref 70–99)
HCT VFR BLD AUTO: 36.9 % (ref 37–53)
HGB BLD-MCNC: 12.7 G/DL (ref 13.5–17.5)
MCHC RBC AUTO-ENTMCNC: 34.4 G/DL (ref 31.5–36.5)
MCV RBC AUTO: 95 FL (ref 80–100)
NRBC # BLD AUTO: 0 K/MM3 (ref 0–0.02)
NRBC BLD AUTO-RTO: 0 /100 WBC (ref 0–0.2)
PLATELET # BLD AUTO: 417 K/MM3 (ref 150–400)
POTASSIUM SERPL-SCNC: 4.6 MMOL/L (ref 3.5–5.5)
SODIUM SERPL-SCNC: 136 MMOL/L (ref 136–145)

## 2022-08-14 NOTE — NUR
SHIFT SUMMARY
RT TRIED TO TRANSITION PT TO 8L NC TODAY, PT TOLERATED THAT FOR ABOUT 2HR THEN
DESATURATED DURING LUNCH AND WAS PLACED BACK ON THE AIRVO AT 35L/30% FiO2.
SpO2 REMAINED >92% ON BOTH OF THOSE ONLY DESATURATING TO THE LOW 80's WITH
ACTIVITY AND EATING. BP STABLE, HR WAS SR-ST, 'S. PT DENIES FEELING SOB.
WILL CONTINUE TO MONITOR AND PROVIDE CARE UNTIL REPORT TO NOC.

## 2022-08-14 NOTE — NUR
SHIFT SUMMARY
PT ALERT AND ORIENTED X4. THERE HAVE BEEN NO ACUTE CHANGES T/O THE NIGHT.
VITALS ARE STABLE. PT REMAINS ON HFT 35L 35% FIO2 WITH SATS ABOVE 90%, WILL
DESAT WITH EXERTION. PT DENIES CHEST PAIN/PRESSURE. CALL LIGHT IS WITHIN
REACH.

## 2022-08-15 NOTE — NUR
SHIFT SUMMARY
RT DECIDED NOT TO TRY TRANSITIONING PT OFF OF AIRVO TODAY. SpO2> 92% WITH
AIRVO SETTINGS AT 35L/35% FiO2, ONLY DESATURATING TO LOW 80'S WITH ACTIVITY
AND EATING. BP STABLE, NSR-ST 's. PT DENIES FEELING SOB. CHEST X-RAY
TOMORROW WITH HOPES TO FIGURE OUT WHY HIS OXYGEN NEEDS ARE NOT IMPROVING. WILL
CONTINUE TO MONITOR AND PROVIDE CARE UNTIL REPORT TO NOC.

## 2022-08-15 NOTE — NUR
SHIFT SUMMARY
PT IS ALERT AND ORIENTED. THERE HAVE BEEN NO ACUTE CHANGES T/O TE NIGHT.
VITALS ARE STBALE AND IS CURRENTLY ON HFT 35L 35 FIO2 WITH SATS ABOVE 90%, HE
DESATS WITH EXERTION INTO THE HIGH 70'S LOW 80'S. PT NANCY CHEST
PAIN/PRESSURE OR SOB. HE IS ABLE TO USE BSC OR URINAL AT BEDSIDE WITH SBA.
CALL LIGHT IS WITHIN REACH.

## 2022-08-16 NOTE — NUR
CALLED DR VALADEZ REGARDING PT'S 2V XRAY FOR THIS MORNING. CALLED TO SEE IF IT
COULD BE CHANGED TO 1V XRAY SO THAT PORTABLE COULD BE USED.  PT IS UNABLE TO
USE A NASAL CANULA AND WILL HAVE TO GOT TO DOWN FOR THAT CERTAIN 2V XRAY.
-----------------------------------------------------------------------------
Addendum: 08/16/22 at 0622 by Cynthia Alvarez RN
 
NO ANSWER FROM DR VALADEZ.

## 2022-08-16 NOTE — NUR
SHIFT SUMMARY
PT IS ALERT AND ORIENTED. THERE HAVE BEEN NO ACUTE CHANGES T/O THE NIGHT.
VITALS ARE STABLE AND REMAINS ON HHFT 35L 35% FIO2 WITH SATS >90%. HE DOES
DESAT INTO THE LOW 80'S WHEN GETTING UP AND % HHFT. HFNC WAS ATTEMPTED
BY RT AT BEGINING OF SHIFT BUT PT COULD WAS SATING IN THE 80'S.  PT DENIES
CHEST PAIN/PRESSURE. USES URINAL AT BEDISDE OR PIVOTS TO BSC. CALL LIGHT IS
WITHIN REACH.

## 2022-08-16 NOTE — NUR
SHIFT SUMMARY
A&Ox4, BP STABLE, NSR-ST 's. SpO2> 92% VIA ARIVO SET TO 35L/45% FiO2. PT
DESATURATES TO LOW 80'S WITH ACTIVITY, SUCH AS SITTING UP AT THE EDGE OF THE
BED. CHEST X-RAY DONE TODAY. PT DENIES CP OR SOB. WILL CONTINUE TO MONITOR AND
PROVIDE CARE UNTIL REPORT TO NOC.

## 2022-08-17 LAB
ANION GAP SERPL CALCULATED.4IONS-SCNC: 4 MMOL/L (ref 6–16)
BASOPHILS # BLD AUTO: 0.11 K/MM3 (ref 0–0.23)
BASOPHILS NFR BLD AUTO: 1 % (ref 0–2)
BUN SERPL-MCNC: 9 MG/DL (ref 8–24)
CALCIUM SERPL-MCNC: 7.9 MG/DL (ref 8.5–10.1)
CHLORIDE SERPL-SCNC: 102 MMOL/L (ref 98–108)
CO2 SERPL-SCNC: 28 MMOL/L (ref 21–32)
CREAT SERPL-MCNC: 0.19 MG/DL (ref 0.6–1.2)
DEPRECATED RDW RBC AUTO: 42.5 FL (ref 35.1–46.3)
EOSINOPHIL # BLD AUTO: 0.17 K/MM3 (ref 0–0.68)
EOSINOPHIL NFR BLD AUTO: 1 % (ref 0–6)
ERYTHROCYTE [DISTWIDTH] IN BLOOD BY AUTOMATED COUNT: 12.2 % (ref 11.7–14.2)
GLUCOSE SERPL-MCNC: 112 MG/DL (ref 70–99)
HCT VFR BLD AUTO: 38.5 % (ref 37–53)
HGB BLD-MCNC: 13.4 G/DL (ref 13.5–17.5)
IMM GRANULOCYTES # BLD AUTO: 0.19 K/MM3 (ref 0–0.1)
IMM GRANULOCYTES NFR BLD AUTO: 1 % (ref 0–1)
LYMPHOCYTES # BLD AUTO: 0.42 K/MM3 (ref 0.84–5.2)
LYMPHOCYTES NFR BLD AUTO: 2 % (ref 21–46)
MCHC RBC AUTO-ENTMCNC: 34.8 G/DL (ref 31.5–36.5)
MCV RBC AUTO: 94 FL (ref 80–100)
MONOCYTES # BLD AUTO: 2.17 K/MM3 (ref 0.16–1.47)
MONOCYTES NFR BLD AUTO: 12 % (ref 4–13)
NEUTROPHILS # BLD AUTO: 15.03 K/MM3 (ref 1.96–9.15)
NEUTROPHILS NFR BLD AUTO: 83 % (ref 41–73)
NRBC # BLD AUTO: 0 K/MM3 (ref 0–0.02)
NRBC BLD AUTO-RTO: 0 /100 WBC (ref 0–0.2)
PLATELET # BLD AUTO: 499 K/MM3 (ref 150–400)
POTASSIUM SERPL-SCNC: 4.3 MMOL/L (ref 3.5–5.5)
SODIUM SERPL-SCNC: 134 MMOL/L (ref 136–145)

## 2022-08-17 PROCEDURE — 5A09357 ASSISTANCE WITH RESPIRATORY VENTILATION, LESS THAN 24 CONSECUTIVE HOURS, CONTINUOUS POSITIVE AIRWAY PRESSURE: ICD-10-PCS | Performed by: INTERNAL MEDICINE

## 2022-08-17 NOTE — NUR
ASSUMPTION OF CARE
 
CHARLI ARAGON AND SONI ARAGON ASSUMED CARE OF PATIENT AT 0700. REPORT TAKEN FROM
DAVID ARAGON. PT RESTING COMFORTABLY. VSS. AIRVO ON AT 35L AND 55% FIO2. BED IN
LOWEST POSITION AND CALL LIGHT WITHIN REACH

## 2022-08-17 NOTE — NUR
PATIENT UPDATE.
 
PATIENT PUT BACK ON AIRVO AT 45L AND 65% FIO2 FOR MEDICAITON
ADMINISTRATION. PATIENT WAS PUT % O2 DUE TO CONTINUED DESATTING. 02
DROPPED TO 85% WHILE GETTING 100% FIO2. CURRENTLY AT 91% WHILE AT REST WITH
CONTINUED 100% FIO2. DUE TO CONTINUED NEED % FIO2 AND THE PATIENT
WANTING TO GO BACK TO SLEEP, PT WAS PUT BACK ON THE BIPAP WITH THE PREVIOUS
SETTINGS OF 18/10 AND 65%. PATIENT MAINTAINING SATS 88% WITH 100% FIO2 AFTER
INITIAL SWITCH. AFTER AN ADDITIONAL 4 MINUTES % FIO2 PATIENT WAS ABLE TO
RETURN TO 65% FIO2 WITH O2 SATS AT >90% WITH RR 26-28.

## 2022-08-17 NOTE — NUR
PATIENT UPDATE
 
PATIENT WAS PREVIOUSLY WORKING WITH THE SPEECH THERAPIST WITH AIRVO ON AT 35L
AND FIO2 55%. DUE TO CONTINUED SOB AND O2 DROPPING WHILE WORKING WITH
THERAPIST, RT WAS CONSULTED. CRACKLES HEARD IN BASES AND PT WAS SWITCHED TO
BIPAP AT 18/10 WITH 65% FIO2. PATIENT'S CURRENT SPO2 IS 90-92% WITH THOSE
BIPAP SETTINGS. RR CONTINUES TO BE 26-30. CONTINUOUS O2 MONITORING IN PLACE.
BED IN LOWEST POSITION WITH CALL LIGHT IN REACH. PT VERBALIZING COMFORT.

## 2022-08-17 NOTE — NUR
ASSUMED CARE OF PATIENT AT THIS TIME. PATIENT RESTING COMFORTABLY. VSS, ON
AIRVO 60L 60%. ACCESSED PORT PER ORDER TO ADMINISTER INCOMPATIBLE MEDICATIONS.
PRUDENCIO DECKER RN

## 2022-08-17 NOTE — NUR
PATIENT UPDATE
 
AFTER TALKING WITH RT ABOUT PATIENT'S RESPIRATORY STATUS, WE DECIDED TO
TRANSFER THE PATIENT TO THE BEDSIDE RECLINER FOR A CHANGE OF POSITION TO HELP
THE LUNGS BETTER EXPAND. THE PATIENT WAS ON THE BIPAP WITH 18/10 AND 65% FIO2
WITH O2 SATS AT 96% PRIOR TO TRANSFERRING.
 
PATIENT WAS GIVEN 100% FIO2 PRIOR TO GETTING UP FOR 4 MINUTES, WITH O2 SATS
REACHING 99%.
 
PATIENT TRANSFERRED WELL WITH SBA. CONTINUED 100% FIO2 GIVEN. PATIENT DROPPED
TO 85% AFTER TRANSFERRING TO CHAIR. FIO2 WAS CONTINUED % FOR 6 MORE
MINUTES WITH THE PATIENT REACHING 90% AFTERWARDS WITH SETTINGS BACK AT 65%
FIO2. WILL CONTINUE TO MONITOR WHILE PATIENT IS IN RECLINER WITH CONTINUOUS
SPO2 MONITORING. BED LINEN CHANGED AND CALL LIGHT GIVEN TO PATIENT.

## 2022-08-17 NOTE — NUR
PATIENT UPDATE
 
PATIENT WAS ABLE TO SIT IN RECLINER FOR APPROXIMATELY 20 MINUTES WITH O2 SATS
AT 90% WITH BIPAP AT 18/10 AND 65% FIO2. AFTER THOSE 20 MINUTES THE PATIENT
BEGAN DESATTING PROGRESSIVELY REQUIRING 100 FIO2 TO MAINTAIN O2 SATS BETWEEN
82-85%. RT GISELA TO BEDSIDE TO ASSIST IN TRANSFER BACK TO BED. CONTINUED 100
FIO2 WITH CHANGE TO BIPAP SETTINGS TO 18/12 WITH 100% FIO2. PATIENTS O2 WAS AT
88% AT TIME OF TRANSFER.
 
PATIENT WAS ABLE TO MAINTAIN O2 SATURATION AT 88% WITH BIPAP SETTINGS,
HOWEVER, PATIENT BEGAN TO COUGH AND STATED THAT HE NEEDED TO "THROW UP".
SWITCHED PATIENT TO AIRVO AND EMESIS BAG GIVEN TO PATIENT. YELLOW MUCUS NOTED
IN BAG. DURING COUGHING SPELL PATIENT'S O2 SATS DROPPED TO 70%. THIS RN,
SONI ARAGON, AND RT HIGGINS REMAINED AT BEDSIDE. ALLYSON CLAROS ARRIVED TO ROOM FOR
UPDATE ON PATIENT. PATIENTS STATUS WAS EXPLAINED, ALONG WITH THE ACTIVITIES
THAT LEAD TO THE EXERTION AND THE INTERVENTIONS STATED PREVIOUSLY. MD VALADEZ
VERBALIZED THAT THE PATIENT SHOULD BE TRANSFERRED TO THE ICU DUE TO THE
CONTINUED INCREASED NEED FOR BIPAP AND MAXED OUT SETTINGS ON BIPAP/AIRVO.
 
PATIENT WAS TRANSFERRED TO ICU RM 6 WITH RT. BEDSIDE REPORT WAS GIVEN TO
EM ARAGON UPON ARRIVAL.

## 2022-08-17 NOTE — NUR
TRANSFER TO ICU
PT ARRIVES TO ICU AT 1630 FROM PCU FOR DESATURATION. ARRIVES ON AIRVO
60L/100%. PT SPEAKING IN FULL SENTANCES. PT P/W/D. ENCOURAGED DEEP BREATHING.
ON ARRIVAL, AIRVO UNPLUGGED, ONCE REATTACHED, O2 SAT INCREASED FROM 77% TO
100%. CURRENTLY TITRATING O2 DOWN, 60L/70% AT THIS TIME. NO DISTRESS NOTED.
RESTING IN BED. ABLE TO HELP c REPOSITIONS AND TURNS. ST ON MONITOR, RATE
. BP STABLE. WILL CONTINUE TO MONITOR UNTIL ONCOMING NURSE.

## 2022-08-18 LAB
ALBUMIN SERPL BCP-MCNC: 1.5 G/DL (ref 3.4–5)
ALBUMIN/GLOB SERPL: 0.3 {RATIO} (ref 0.8–1.8)
ALT SERPL W P-5'-P-CCNC: 44 U/L (ref 12–78)
ANION GAP SERPL CALCULATED.4IONS-SCNC: 6 MMOL/L (ref 6–16)
AST SERPL W P-5'-P-CCNC: 62 U/L (ref 12–37)
BASOPHILS # BLD AUTO: 0.11 K/MM3 (ref 0–0.23)
BASOPHILS NFR BLD AUTO: 1 % (ref 0–2)
BILIRUB SERPL-MCNC: 0.4 MG/DL (ref 0.1–1)
BUN SERPL-MCNC: 11 MG/DL (ref 8–24)
CALCIUM SERPL-MCNC: 7.8 MG/DL (ref 8.5–10.1)
CHLORIDE SERPL-SCNC: 100 MMOL/L (ref 98–108)
CO2 SERPL-SCNC: 30 MMOL/L (ref 21–32)
CREAT SERPL-MCNC: 0.52 MG/DL (ref 0.6–1.2)
DEPRECATED RDW RBC AUTO: 43.1 FL (ref 35.1–46.3)
EOSINOPHIL # BLD AUTO: 0.1 K/MM3 (ref 0–0.68)
EOSINOPHIL NFR BLD AUTO: 1 % (ref 0–6)
ERYTHROCYTE [DISTWIDTH] IN BLOOD BY AUTOMATED COUNT: 12.3 % (ref 11.7–14.2)
GLOBULIN SER CALC-MCNC: 4.6 G/DL (ref 2.2–4)
GLUCOSE SERPL-MCNC: 108 MG/DL (ref 70–99)
HCT VFR BLD AUTO: 36.9 % (ref 37–53)
HGB BLD-MCNC: 12.8 G/DL (ref 13.5–17.5)
IMM GRANULOCYTES # BLD AUTO: 0.17 K/MM3 (ref 0–0.1)
IMM GRANULOCYTES NFR BLD AUTO: 1 % (ref 0–1)
LYMPHOCYTES # BLD AUTO: 0.58 K/MM3 (ref 0.84–5.2)
LYMPHOCYTES NFR BLD AUTO: 3 % (ref 21–46)
MCHC RBC AUTO-ENTMCNC: 34.7 G/DL (ref 31.5–36.5)
MCV RBC AUTO: 94 FL (ref 80–100)
MONOCYTES # BLD AUTO: 2.15 K/MM3 (ref 0.16–1.47)
MONOCYTES NFR BLD AUTO: 12 % (ref 4–13)
NEUTROPHILS # BLD AUTO: 15.42 K/MM3 (ref 1.96–9.15)
NEUTROPHILS NFR BLD AUTO: 83 % (ref 41–73)
NRBC # BLD AUTO: 0 K/MM3 (ref 0–0.02)
NRBC BLD AUTO-RTO: 0 /100 WBC (ref 0–0.2)
PLATELET # BLD AUTO: 554 K/MM3 (ref 150–400)
POTASSIUM SERPL-SCNC: 3.9 MMOL/L (ref 3.5–5.5)
PROT SERPL-MCNC: 6.1 G/DL (ref 6.4–8.2)
SODIUM SERPL-SCNC: 136 MMOL/L (ref 136–145)
VANCOMYCIN TROUGH SERPL-MCNC: 14.7 UG/ML (ref 5–10)

## 2022-08-18 NOTE — NUR
SHIFT SUMMARY
PATIENT RESTED THROUGHOUT EVENING. FREQUENT EPISODES OF COUGHING WITH
PRODUCTIVE SPUTUM. ONE EPISODE OF EMESIS WITH NAUSEA RELIEF FROM MEDICATION.
AIRVO TITRATED TO 60L 50%. DESATS WITH MINOR EXERTION AND REQUIRES LENGTHY
RECOVERY TIME. TKO RUNNING THROUGH Mandic. ONE BM THIS AM ON BEDPAN.
QUINTON DECKER RN

## 2022-08-18 NOTE — NUR
END OF SHIFT SUMMARY
A/O X4.  AIRVO RUNNING CURRENTLY AT 60/55% .  DESATS TO LOW 80'S WITH ANY
EXERTION.  NO PT TODAY DUE TO BEDREST AND REST DAY.  TKO RUNNING AT 10 MLS/HR.
ZOSYN CURRENTLY RUNNING AS ORDERED.  FAMILY IN ROOM INTERMITTENTLY THROUGHOUT
THE DAY. NO CARDIAC ISSUES DURING THIS SHIFT. VITALS WNL WITH EXCEPTION OF O2
SATS WITH EXERTION. NO BOWEL MOVEMENT TODAY. SCANT URINE OUTPUT. PEGG TUBE TO
BE PLACED 08/19. NPO UNTIL FURTHER NOTICE. PRODUCTIVE COUGH WITH MODERATE
AMOUNT OF TAN SPUTUM.

## 2022-08-18 NOTE — NUR
ASSUMED CARE OF PT AT 0700
PT RESTING IN ROOM. NO VISITORS AT THIS TIME. TKO
RUNNING TO PORT AND POWERGLIDE.  AIRVO FLOW OF 60 AT 50%.  SEE ASSESSMENT FOR
MORE INFORMATION.

## 2022-08-19 LAB
ALBUMIN SERPL BCP-MCNC: 1.6 G/DL (ref 3.4–5)
ALBUMIN/GLOB SERPL: 0.3 {RATIO} (ref 0.8–1.8)
ALT SERPL W P-5'-P-CCNC: 55 U/L (ref 12–78)
ANION GAP SERPL CALCULATED.4IONS-SCNC: 7 MMOL/L (ref 6–16)
AST SERPL W P-5'-P-CCNC: 83 U/L (ref 12–37)
BASOPHILS # BLD AUTO: 0.11 K/MM3 (ref 0–0.23)
BASOPHILS NFR BLD AUTO: 1 % (ref 0–2)
BILIRUB SERPL-MCNC: 0.3 MG/DL (ref 0.1–1)
BUN SERPL-MCNC: 14 MG/DL (ref 8–24)
CALCIUM SERPL-MCNC: 7.5 MG/DL (ref 8.5–10.1)
CHLORIDE SERPL-SCNC: 99 MMOL/L (ref 98–108)
CO2 SERPL-SCNC: 30 MMOL/L (ref 21–32)
CREAT SERPL-MCNC: 0.51 MG/DL (ref 0.6–1.2)
DEPRECATED RDW RBC AUTO: 42.5 FL (ref 35.1–46.3)
EOSINOPHIL # BLD AUTO: 0.18 K/MM3 (ref 0–0.68)
EOSINOPHIL NFR BLD AUTO: 1 % (ref 0–6)
ERYTHROCYTE [DISTWIDTH] IN BLOOD BY AUTOMATED COUNT: 12.1 % (ref 11.7–14.2)
GLOBULIN SER CALC-MCNC: 4.7 G/DL (ref 2.2–4)
GLUCOSE SERPL-MCNC: 100 MG/DL (ref 70–99)
HCT VFR BLD AUTO: 37.9 % (ref 37–53)
HGB BLD-MCNC: 13.1 G/DL (ref 13.5–17.5)
IMM GRANULOCYTES # BLD AUTO: 0.2 K/MM3 (ref 0–0.1)
IMM GRANULOCYTES NFR BLD AUTO: 1 % (ref 0–1)
LYMPHOCYTES # BLD AUTO: 0.43 K/MM3 (ref 0.84–5.2)
LYMPHOCYTES NFR BLD AUTO: 2 % (ref 21–46)
MCHC RBC AUTO-ENTMCNC: 34.6 G/DL (ref 31.5–36.5)
MCV RBC AUTO: 95 FL (ref 80–100)
MONOCYTES # BLD AUTO: 2.17 K/MM3 (ref 0.16–1.47)
MONOCYTES NFR BLD AUTO: 11 % (ref 4–13)
NEUTROPHILS # BLD AUTO: 15.91 K/MM3 (ref 1.96–9.15)
NEUTROPHILS NFR BLD AUTO: 84 % (ref 41–73)
NRBC # BLD AUTO: 0 K/MM3 (ref 0–0.02)
NRBC BLD AUTO-RTO: 0 /100 WBC (ref 0–0.2)
PLATELET # BLD AUTO: 586 K/MM3 (ref 150–400)
POTASSIUM SERPL-SCNC: 3.9 MMOL/L (ref 3.5–5.5)
PROT SERPL-MCNC: 6.3 G/DL (ref 6.4–8.2)
SODIUM SERPL-SCNC: 136 MMOL/L (ref 136–145)

## 2022-08-19 PROCEDURE — 0DJ08ZZ INSPECTION OF UPPER INTESTINAL TRACT, VIA NATURAL OR ARTIFICIAL OPENING ENDOSCOPIC: ICD-10-PCS | Performed by: SURGERY

## 2022-08-19 PROCEDURE — 0DH63UZ INSERTION OF FEEDING DEVICE INTO STOMACH, PERCUTANEOUS APPROACH: ICD-10-PCS | Performed by: SURGERY

## 2022-08-19 NOTE — NUR
SUMMARY
NEURO: WNL, GENERALIZED WEAKNESS
LUNGS: DIMINISHED THROUGHOUT BUT CLEAR. PT IS ON 60L HFNC 100%. CLEAR/FOAMY
SPUTUM. PT PULLING 1000ML ON INCENTIVE SPIROMETER AND USING FLUTTER VALVE.
CARDIAC: NSR, PULSES PALPABLE.
GI: PEG TUBE PLACED AT 1300 TODAY.
: LOW URINE OUTPUT, CHERRY

## 2022-08-19 NOTE — NUR
End of shift summary
No acute changes overnight. Pt stable on arvo 60L and 55%. Pt remains NPO for
feeding tube placement. Pt does use ice chips to moisten mouth. Pt able to
turn self in bed lift hips and alleviate pressure to coccyx. Blood drawn from
power glide Caps changed and line flushed after draw per protocol. Will give
report to oncoming RN.

## 2022-08-20 LAB
ANION GAP SERPL CALCULATED.4IONS-SCNC: 7 MMOL/L (ref 6–16)
BASOPHILS # BLD AUTO: 0.08 K/MM3 (ref 0–0.23)
BASOPHILS NFR BLD AUTO: 0 % (ref 0–2)
BUN SERPL-MCNC: 11 MG/DL (ref 8–24)
CALCIUM SERPL-MCNC: 7 MG/DL (ref 8.5–10.1)
CHLORIDE SERPL-SCNC: 96 MMOL/L (ref 98–108)
CO2 SERPL-SCNC: 32 MMOL/L (ref 21–32)
CREAT SERPL-MCNC: 0.43 MG/DL (ref 0.6–1.2)
DEPRECATED RDW RBC AUTO: 41.5 FL (ref 35.1–46.3)
EOSINOPHIL # BLD AUTO: 0.08 K/MM3 (ref 0–0.68)
EOSINOPHIL NFR BLD AUTO: 0 % (ref 0–6)
ERYTHROCYTE [DISTWIDTH] IN BLOOD BY AUTOMATED COUNT: 11.9 % (ref 11.7–14.2)
GLUCOSE SERPL-MCNC: 129 MG/DL (ref 70–99)
HCT VFR BLD AUTO: 37.5 % (ref 37–53)
HGB BLD-MCNC: 12.7 G/DL (ref 13.5–17.5)
IMM GRANULOCYTES # BLD AUTO: 0.19 K/MM3 (ref 0–0.1)
IMM GRANULOCYTES NFR BLD AUTO: 1 % (ref 0–1)
KETONES UR STRIP-MCNC: (no result) MG/DL
LYMPHOCYTES # BLD AUTO: 0.31 K/MM3 (ref 0.84–5.2)
LYMPHOCYTES NFR BLD AUTO: 2 % (ref 21–46)
MCHC RBC AUTO-ENTMCNC: 33.9 G/DL (ref 31.5–36.5)
MCV RBC AUTO: 94 FL (ref 80–100)
MONOCYTES # BLD AUTO: 2.02 K/MM3 (ref 0.16–1.47)
MONOCYTES NFR BLD AUTO: 10 % (ref 4–13)
NEUTROPHILS # BLD AUTO: 16.91 K/MM3 (ref 1.96–9.15)
NEUTROPHILS NFR BLD AUTO: 86 % (ref 41–73)
NRBC # BLD AUTO: 0 K/MM3 (ref 0–0.02)
NRBC BLD AUTO-RTO: 0 /100 WBC (ref 0–0.2)
PLATELET # BLD AUTO: 561 K/MM3 (ref 150–400)
POTASSIUM SERPL-SCNC: 3 MMOL/L (ref 3.5–5.5)
PROT UR STRIP-MCNC: (no result) MG/DL
RBC #/AREA URNS HPF: (no result) /HPF (ref 0–2)
SODIUM SERPL-SCNC: 135 MMOL/L (ref 136–145)
SP GR SPEC: 1.01 (ref 1–1.02)
URN SPEC COLLECT METH UR: (no result)
UROBILINOGEN UR STRIP-MCNC: (no result) MG/DL

## 2022-08-20 PROCEDURE — 0BH18EZ INSERTION OF ENDOTRACHEAL AIRWAY INTO TRACHEA, VIA NATURAL OR ARTIFICIAL OPENING ENDOSCOPIC: ICD-10-PCS | Performed by: INTERNAL MEDICINE

## 2022-08-20 PROCEDURE — 02HV33Z INSERTION OF INFUSION DEVICE INTO SUPERIOR VENA CAVA, PERCUTANEOUS APPROACH: ICD-10-PCS

## 2022-08-20 PROCEDURE — 5A1935Z RESPIRATORY VENTILATION, LESS THAN 24 CONSECUTIVE HOURS: ICD-10-PCS

## 2022-08-20 NOTE — NUR
SHIFT SUMMARY
 
PT CURRENTLY ON BIPAP 14/8, RATE 12, 95% FI02, PT FAIL CPAP TRIAL. CURRENT
SATS 91-92%, RR 30-45, TACHYCARDIC. PT REPORTS INTERMITTENT NAUSEA, FEELS LIKE
HE IS ASPIRATING STOMACH ACID. TUBE FEEDING PLACED ON HOLD PER ORDER. PT
CONTINUES TO BE ALERT AND ORIENTED, LETHARGIC. USES CALL LIGHT APPROPRIATELY.
WILL REPORT TO ONCOMING NURSE.

## 2022-08-20 NOTE — NUR
ASSUMED CARE OF PT AT 1915. REPORT RECEIVED AT BEDSIDE. PT PRESENTS IN BED.
BIPAP IN PLACE. PT IS ABLE TO EXPECTORATE YELLOW COLORED SECRETIONS. USES
YAUNKEUR TO SELF SUCTION. PT DENIES PAIN. IS ABLE TO UNDERSTAND AND FOLLOW
CONVERSATION ABOUT BEING INTUBATED. PT'S MOTHER AND HIS SISTER HAVE JUST GONE
HOME FOR THE NIGHT. HAVE SPOKEN TO PT'S WIFE PER TELEPHONE. UPDATE GIVEN.
EXPLAINED THAT PLAN FOR INTUBATION THIS EVENING WAS IN PLACE. WILL REVIEW
CHART AND PLAN OF CARE FOR THIS PT.

## 2022-08-20 NOTE — NUR
END OF SHIFT SUMMARY
NO ACUTE CHANGES OVERNIGHT. PT PAIN CONTROLLED WITH IV PUSH FENTANYL. PT DID
GET NAUSEATED AND THAT WAS RELIEVED WITH PHENERGAN. PEG TUBE WAS PLACE 8-19-22
@ 1300 CAN BE USED AFTER 1300 ON 8-20-22. PT THIN AND FRAGILE. PT ABLE TO
FREQUENTLY REPOSITION SELF IN BED. PT DOES DESAT WITH MOVMENT. PT ON 60L AND
90% ON ARIVO. WILL GIVE BEDSIDE REPORT TO ONCOMING RN.

## 2022-08-20 NOTE — NUR
PT REPORTING NAUSEA, 1 EPISODE OF EMESIS. HOB RAISED, PT IMMEDIATELY
DESATS. FI02 INCREASED % TO MAINTAIN SATS>90. PT REPORTS 7/10 PAIN
AROUND PEG TUBE SITE. PT TREATED FOR NAUSEA AND PAIN PER EMAR.

## 2022-08-20 NOTE — NUR
ASSUMED CARE OF PT, REPORT RCV'D FROM MARGO BROCK.
 
PT ALERT AND ORIENTED, PLEASANT AND COOPERATIVE WITH CARE. PT REPORT 7/10
ABDOMINAL PAIN, TREATED PER EMAR. PT REPORTS INTERMITTANT NAUSEA, NO
VOMITTING. AIRVO 60L, FI02 DECREASED FROM 90% TO 60%, SATS CURRENTLY 92%. LUNG
SOUNDS DIM T/O. PEG TUBE SITE CLEAN/DRY. VSS AT THIS TIME. PT'S SISTER AND
MOTHER AT BEDSIDE, UPDATED WITH PT'S STATUS AND PLAN OF CARE. SEE FULL SHIFT
ASSESSMENT.

## 2022-08-20 NOTE — NUR
PT CONTINUES TO BE TACHYCARDIC WITH RR IN THE MID 40'S. DECISION MADE TO
INTUBATE PATIENT FOLLOWING NOTIFICATION OF FAMILY. DISCUSSED PLAN WITH PT WHO
IS AGREEABLE. PTS SISTER AND MOTHER AT BEDSIDE, CALLED PT'S SON SALVADOR AND
DISCUSSED PLAN TO INTUBATE. ATTEMPTED TO CALL PT'S WIFE MILADYS, NO ANSWER AND
MAILBOX FULL. RESPIRATORY THERAPY NOTIFIED.

## 2022-08-20 NOTE — NUR
PT REPORTING INCREASING NAUSEA AND FEELING LIKE "BILE IS GOING INTO MY LUNGS".
FI02 INCREASED % D/T SATS IN MID 80'S. SATS CURRENTLY 91%. DR GABRIEL
NOTIFIED. ORDER TO STOP TUBE FEEDS, START REGLAN AND TRY CPAP.

## 2022-08-20 NOTE — NUR
Supportive visit this afternoon. Spoke with Primary RN Luther and discussed
case.
 
Pt resting in bed with his eyes closed. Pt wakes to gentle verbal stimuli. Pt
appears moderately lethargic and reports being tired. Pt does report 7/10 pain
at PEG Tube insertion site. Reviewed plan of care and offered supportive
visit. Ended visit to allow Pt to rest.
 
Palliative Care will remain available.

## 2022-08-21 LAB
ALBUMIN SERPL BCP-MCNC: 1.5 G/DL (ref 3.4–5)
ALBUMIN/GLOB SERPL: 0.3 {RATIO} (ref 0.8–1.8)
ALT SERPL W P-5'-P-CCNC: 71 U/L (ref 12–78)
ANION GAP SERPL CALCULATED.4IONS-SCNC: 7 MMOL/L (ref 6–16)
AST SERPL W P-5'-P-CCNC: 134 U/L (ref 12–37)
BASOPHILS # BLD AUTO: 0.09 K/MM3 (ref 0–0.23)
BASOPHILS NFR BLD AUTO: 0 % (ref 0–2)
BILIRUB SERPL-MCNC: 0.4 MG/DL (ref 0.1–1)
BUN SERPL-MCNC: 13 MG/DL (ref 8–24)
CALCIUM SERPL-MCNC: 7.1 MG/DL (ref 8.5–10.1)
CHLORIDE SERPL-SCNC: 100 MMOL/L (ref 98–108)
CO2 SERPL-SCNC: 30 MMOL/L (ref 21–32)
CREAT SERPL-MCNC: 0.45 MG/DL (ref 0.6–1.2)
DEPRECATED RDW RBC AUTO: 42.3 FL (ref 35.1–46.3)
EOSINOPHIL # BLD AUTO: 0.07 K/MM3 (ref 0–0.68)
EOSINOPHIL NFR BLD AUTO: 0 % (ref 0–6)
ERYTHROCYTE [DISTWIDTH] IN BLOOD BY AUTOMATED COUNT: 12.1 % (ref 11.7–14.2)
GLOBULIN SER CALC-MCNC: 4.4 G/DL (ref 2.2–4)
GLUCOSE SERPL-MCNC: 140 MG/DL (ref 70–99)
HCT VFR BLD AUTO: 36.1 % (ref 37–53)
HGB BLD-MCNC: 12.6 G/DL (ref 13.5–17.5)
IMM GRANULOCYTES # BLD AUTO: 0.22 K/MM3 (ref 0–0.1)
IMM GRANULOCYTES NFR BLD AUTO: 1 % (ref 0–1)
LYMPHOCYTES # BLD AUTO: 0.46 K/MM3 (ref 0.84–5.2)
LYMPHOCYTES NFR BLD AUTO: 2 % (ref 21–46)
MAGNESIUM SERPL-MCNC: 2.5 MG/DL (ref 1.6–2.4)
MCHC RBC AUTO-ENTMCNC: 34.9 G/DL (ref 31.5–36.5)
MCV RBC AUTO: 95 FL (ref 80–100)
MONOCYTES # BLD AUTO: 2.58 K/MM3 (ref 0.16–1.47)
MONOCYTES NFR BLD AUTO: 11 % (ref 4–13)
NEUTROPHILS # BLD AUTO: 20.83 K/MM3 (ref 1.96–9.15)
NEUTROPHILS NFR BLD AUTO: 86 % (ref 41–73)
NRBC # BLD AUTO: 0 K/MM3 (ref 0–0.02)
NRBC BLD AUTO-RTO: 0 /100 WBC (ref 0–0.2)
PH BLDA: 7.28 [PH] (ref 7.35–7.45)
PH BLDA: 7.44 [PH] (ref 7.35–7.45)
PH BLDA: 7.47 [PH] (ref 7.35–7.45)
PHOSPHATE SERPL-MCNC: 2 MG/DL (ref 2.5–4.9)
PLATELET # BLD AUTO: 551 K/MM3 (ref 150–400)
POTASSIUM SERPL-SCNC: 3.4 MMOL/L (ref 3.5–5.5)
PROT SERPL-MCNC: 5.9 G/DL (ref 6.4–8.2)
SODIUM SERPL-SCNC: 137 MMOL/L (ref 136–145)

## 2022-08-21 PROCEDURE — 3E033XZ INTRODUCTION OF VASOPRESSOR INTO PERIPHERAL VEIN, PERCUTANEOUS APPROACH: ICD-10-PCS

## 2022-08-21 NOTE — NUR
8/21/22 2310  Patient arrived via bed to rm 302.  Pt having some discomfort
with moving to other bed.  Pt states several times, "let me go".  Pt reassured
that we would give him something for pain and that he was free to "go" when
he was ready.  2325 Pt medicated with 2mg IV morphine.  Patient is thin, eyes
are swollen.  He has mepilex to rosie knees.  Serra catheter is in place with dk
yellow urine. Will continue to monitor to keep pt comfortable.

## 2022-08-21 NOTE — NUR
PATIENTS SON SALVADOR IN TO SEE PATIENT AND AFTER TALKING WITH FAMILY AND
PATIENTS WIFE ON THE PHONE HAS DECIDED TO STOP LIFE SUPPORT AND MAKE PATIENT
COMFORT CARE. CALL PLACED TO DOCTOR HARVEY SEE NEW ORDERS.
PATIENT REMAINS PRONE WITH ETT IN PLACE VENT RESP 30 PEEP 18 FIO2 100%
LEVOPHED 5MCG, PRECEDEX 0.7, PROPOFOL 65. NIMBEX REMAINS OFF. NO MOVEMENT SEEN
IN EXTREMITIES. FAMILY AT BEDSIDE

## 2022-08-21 NOTE — NUR
ASSUMPTION OF CARE
RECEIVED REPORT FROM JUSTO ARAGON AT 0715, ASSUMED CARE OF PATIENT. PATIENT
PRONED, HEAD TURNED TO LEFT SIDE. INTUBATED ETT 8.0, 28CM AT THE GUMS. VENT
SETTINGS AC/PC 24/16/100%. SP02 ABOVE 95%, SUCTIONED COPIOUS AMOUNTS OF THICK,
TAN SPUTUM ORALLY AND VIA ETT. SEDATION BISS OF 40-50, PROPOFOL AT 75MCG/KG,
PRECEDEX ON STANDBY, NIMBEX AT 2MCG/KG/MIN. TOF 4/4. TEMP PALACIOS PATENT,
DRAINING CLEAR, CHERRY URINE. TEMP READING BELOW 95 VIA TEMP PALACIOS PROBE, 96.7
VIA TEMPORAL. DR. GABRIEL TO ROOM AT 0740, REVIEWED CURRENT TREATMENT PLAN,
LABS AND VITALS. WILL REVIEW ORDERS AND CONTINUE TO TREAT AS PRESCRIBED.

## 2022-08-21 NOTE — NUR
COMFORT CARE
AFTER OK VIA COLTON CHARGE RN, FROM [facility], RT CALLED TO EXTUBATE
PATIENT.
2000 IV FLUIDS AND LEVOPHED OFF AND PATIENT PLACE SUPINE AFTER SUCTIONING ETT
AND ORAL PATIENT EXTUBATED.
2005 PRECEDEX, PROPOFOL, AND FENTANYL IV OFF AND FAMILY RETURNS TO BEDSIDE.

## 2022-08-21 NOTE — NUR
PATIENT ABLE TO TALK TO HIS MOM ON THE TELEPHONE WITH SISTER HOLDING PHONE.
ABLE TO SPEAK CLEARLY AND WHEN ASKED IF HE WANTED EVERYTHING RESTARTED BY HIS
MOTHER HE SAID "LET ME GO" . WARM BLANKET AND ORAL CARE DONE FOR COMFORT.
CONTINUE COMFORT CARE

## 2022-08-21 NOTE — NUR
PATIENT OPENS EYES SLIGHTLY TO VERBAL STIMULI, NODS SLIGHTLY NO TO PAIN YES TO
NAUSEA SLIGHT SHIVER SEEN

## 2022-08-21 NOTE — NUR
SHIFT SUMMARY
ASSESSMENT AS PREVIOUSLY CHARTED. SPOKE WIHT DR. GABRIEL AT 1400 TO CLARIFY
PRONATION. RECEIVED DIRECTION TO LEAVE PATIENT PRONED UNTIL 1600, SUPINATE FOR
8 HOURS IF TOLERATED. PATIENT BEGAN HAVING PERIODS OF LOW SATURATIONS IN THE
80'S. FI02 WAS INCREASED % WITH PEEP AT 16. AT 1600 RT CONCERNED ABOUT
SUPINATING PATIENT WITH VENT SETTINGS, RN AGREED. HEAD TURNED COMPLETED WITH 3
STAFF MEMBERS. PATIENT BEGAN DESATING TO LOW 80'S, B/P DECREASED. CALLED DR. GABRIEL AT 1605, RECEIVED ORDERS TO INCREASE PEEP TO 18, RESP. RATE TO 30 AND
START LEVOPHED. PATIENT BEGAN DECLINING. RT TO ROOM AND BEGAN BAGGING PATIENT
VIA ETT AT 1610, BAGGED PATIENT FOR 15 MINUTES UNTIL SP02 IN LOW 90'S. ONCE
PLACED BACK ONTO VENT PATIENT BEGAN DESATTING QUICKLY AND REQUIRED BAGGING
AGAIN. DR. GABRIEL NOTIFIED AND CAME TO ROOM AT 1630. PATIENT'S WIFE CALLED,
AND MOTHER AND SISTER NOTIFIED TO COME TO ROOM. DR. GABRIEL SPOKE WITH WIFE
MILADYS VIA T/P AT 1645
AND EXPLAINED PATIENT'S CRITICAL STATUS. DISCUSSED CODE STATUS,
PATIENT'S WIFE TO SPEAK WITH PATIENT'S SON TO MAKE A DECISION. AWAITING SON TO
ARRIVE TO ROOM, PATIENT'S SISTER CALLED SON AT 1650 AND AGAIN AT 1815. BOTH
TIMES RECEIVED AN ANSWER THAT HE WAS ON HIS WAY. PATIENTS' MOTHER AND SISTER
CONTINUED TO ASK IF WE COULD STOP CURRENT CARE AS THIS WAS NOT WHAT HE WANTED.
EXPLAINED PATIENT'S CODE STATUS AS FULL AND THAT HIS NEXT OF KIN WOULD NEED TO
MAKE THE FINAL DECISION. FAMILY VERBALIZED UNDERSTANDING. DURING THIS TIME, RT
CONTINUED TO BAG PATIENT UNTIL 1830. DR. GABRIEL NOTIFIED AGAIN OF CONTINUED
BAGGING AND THAT SON HAS NOT ARRIVED. RECEIVED ORDERS TO PLACE BACK ON VENT
WITH PEEP 18 AND FI02 100%. CURRENT SP02 87%. ORAL CARE AND OTHER EVENING
MEDICATIONS HELD DUE TO CRITICAL CONDITION. NIMBEX WAS PLACED ON STANDBY AS
CHARTED PER DR. GABRIEL'S DIRECTIONS, SEDATION OF PROPOFOL AND PRECEDEX TO
CONTINUE WITH BIS 40-50. WILL CONTINUE TREATMENT AS PRESCRIBED, AND REPORT TO
ONCOMING RN.

## 2022-08-21 NOTE — NUR
DR ALEGRE COMES TO ROOM AND INTUBATION OF PT TAKES PLACE AT 2206 THIS
EVENING. PT DOES HAVE DESATURATION AFTER THIS. SEE VS FLOWSHEET FOR DETAILS.
PROPOFL HAS BEEN INITIATED AND TITRATED TO 50 MCG'S FOR VENT TOLERANCE. HAVE
SPOKEN TO DR GABRIEL PER PHONE WITH PT'S POOR SATURATIONS. DID RECEIVE ORDERS.
RT HAS BEEN IN ROOM DURING POST INTUBATION TO ASSIST IN REGAINING SATURATIONS.
PT ALSO MEDICATED WITH TYLENOL PER PEG TUBE FOR FEVER .0 PT TACHYCARDIC.
OVERBREATHES VENT. SEE VS FLOWSHEET FOR VITALS.

## 2022-08-21 NOTE — NUR
PT HAS BEEN PRONE SECONDARY TO PT'S FAILURE TO MAINTAIN SATURATIONS > 85
PERCENT. CALL ALSO MADE TO DR GABRIEL. ORDERS RECEIVED. PT PLACED IN
[place] POSITION WHICH HAS ASSISTED PT TO SATURATIONS 90-92 PERCENT.
BLOOD PRESSURES REMAINS HYPOTENSIVE 89/63 BEING LAST OBTAINED VALUE. WILL CALL
FOR PRESSOR IF NEEDED. BLOOD CULTURES HAVE BEEN OBTAINED. WILL CONTINUE
TO MONITOR.

## 2022-08-21 NOTE — NUR
SON SALVADOR TO ROOM AT 1845, DISCUSSED AT BEDSIDE THAT HE WANTED TO MAKE HIS
FATHER A DNR AND HE CALLED HIS MOTHER MILADYS TO CONFIRM WISHES. NOTIFIED DR. GABRIEL AND CHANGED CODE STATUS TO DNR.

## 2022-08-22 NOTE — NUR
Rn summary:  Patient opens eyes, will ask to have mouth suctioned.  Pt last
medicated with Roxanol 20mg at 0640 for airhunger and moaning.  Pt is resting
easier and less frantic, respirations are shallow, Report to Marty ARAGON. Pt
continues with Mediport to rt chest wall and powerglide to ASTON.  Continue
comfort care per pt wishes, continues to ask for us to let him go.  Wife is to
be back in town today.

## 2022-08-22 NOTE — NUR
Comfort Care Visit
 
Pt resting in bed upon arrival. Primary RN Marty offering comfort medication
for air hunger. Pt does report pain at insertion site of PEG tube and appears
painful with swallow. Brief discussion about considering hospice. Pt sates "I
have a dog that bites". Continued supportive visit.
 
Pt's sister arrives and discussion took place out in [place] with Primary RN
Marty and this RN. Pt's spouse is still out of town and will be home tomorrow.
Sister is planning to head back home today who is from out of state.
 
Spoke with Dr Palmer and discussed case. Placed order for Fentanyl Patch 25mcg
Q 3 days per V/O from Dr Palmer.
 
Palliative Care will remain available.

## 2022-08-22 NOTE — NUR
PT SITTING IN BED. NON REBREATHER IN PLACE. DENIES PAIN AT THIS TIME. PT IS
ABLE TO MAKE NEEDS KNOWN WITH SHORT YES/NO, BUT LITTLE ELSE. WIFE OUT OF
STATES, BUT ON WAY PER PT. NO OTHER CONCERNS NOTED. BED IN LOW POSITION, CALL
LITE IN REACH,BED ALARM ON FOR SAFETY

## 2022-08-22 NOTE — NUR
DISCUSSED PAIN WITH PT. HE DENIES PAIN AT THIS TIME. DOES HAVE FENTANYL PATCH
ON ANTERIOR LEFT SHOULDER. WILL FOLLOW

## 2022-08-22 NOTE — NUR
1015 GAVE ROXANOL FOR PAIN AND AIR HUNGER. ORDERED UP HIFLO N/C FOR HIS
COMFORT AS WOULD LIKE NONREBREATHER OFF IF CAN.
1130 HEPARIN FLUSHED MEDIPORT. DID NOT D/C LINE. PT HAS OTHER PERIPHERAL
LINES.
PLACED FENTANYL PATCH.

## 2022-08-22 NOTE — NUR
0130 TO 0345  PATIENT HAVING INCREASED DIFFICULTY BREATHING, UNABLE TO GET
COMFORTABLE, MEDICATED WITH ATIVAN 1MG IV, WITH MINIMAL RELIEF, MEDICATED WITH
ROXANOL 10MG THEN FOLLOWED BY THE OTHER 10MG FOR AIR HUNGER AND AGGITATION.
PT WITH A LARGE AMOUNT OF THICK SPUTUM, ALEKSANDAR ORAL SUCTION FREQ FOR ALMOST
AN HOUR.  O2 ON FOR COMFORT.  NURSE AT BEDSIDE FOR ASSIST WITH SUCTION AS
NEEDED. PT IS STILL AWARE, STATES WHEN HE NEEDS SUCTIONED.

## 2022-08-22 NOTE — NUR
Spiritual care visit conducted. Pt is lying in bed and moaning. He only
answers in short sentences and struggles to focus more than a few minutes at
a time. When I asked pt what he is needing he says, "Let me go." I asked if he
was talking about death and he said, "Yes, I am ready now." After further
discussion, I provided prayer for pt to which he repeated a strong "Amen" at
its conclusion. I asked him what is most important to him now and he said, "My
wife." I asked him if he would like me to give her a call. I left the  and
called Brianna (his spouse). She was very emotional on the phone. She told me
that she is trying to get back home from a trip in [place] and said, "Please
tell him I love him. He has to know this he dies before I get there. Tell him
I love him." She became so emotional that she stated that she has to hang up
and start trying to make it to the hospital. I then return to pt's  and
(with his mom and sister now present) I tell him of Brianna's love and he opens
his eyes and tells me thank you. I visit with family for another 40 minutes,
providing emotional support and then let them have time with the pt alone.  I
will continue to remain available to pt and family.

## 2022-08-22 NOTE — NUR
PT ON COMFORT CARE. FAMILY AT BEDSIDE MOST OF DAY. WIFE FLYING IN FROM OUT OF
TOWN TONITES. PT ASKED FOR WATER AND SPRITE.  GAVE. HE MORE AWAKE AT THIS
MOMENT THAN HAS BEEN ALL DAY. HAS BEEN TURNED. PAIN MANAGED TO HIS
SATISFACTION. FENTANYL PATCH LEFT ANTERIOR SHOULDER PLACED TODAY. STATES NOT
IN PAIN MOST TIMES ASKED. NO NEW CONCERNS NOTED. BED IN LOW POSITOIN, CALL
LITE IN REACH, FAMILY CALLS AS NEEDL

## 2022-08-23 NOTE — NUR
COMFORT CARE SUMMARY
 
PATIENT MEDICATED FOR PAIN X3. PATIENT ALSO HAS FENTANYL PATCH IN PLACE.
PATIENT HAS SCHEDULED NAUSEA MEDICATION, PATIENT ALSO MEDICATED FOR NAUSEA X1
WITH PRN. PATIENT MEDICATED X1 WITH MA TYLENOL, PATIENT HAS HEADACHE AND IS
VERY WARM TO TOUCH, DIAPHORETIC. PATIENT HAD MANY VISITORS TODAY. WIFE AT
BEDSIDE ENTIRE SHIFT. REFILLED COMFORT CART. VERBAL ORDER TO DEACCESS
MEDIPORT. PATIENT CALM, COMFORTABLE, AND SLEEPING AT END OF SHIFT.

## 2022-08-23 NOTE — NUR
Pt's wife at bedside, multiple family members also in the room and lobby,
approximately 15 people. Pt is sitting up, eyes open, able to report pain
verbally. While I was at the bedside the pt was able to participate in
conversation with a family member via video chat. Pt's wife stated she is "too
scared" to take pt home with hospice. I asked her what this meant, and she
began crying and said, "It's hard to watch him die". Her family remained
nearby and all stated they would assist her and patient at home. Wife reports
she will speak more to family, and they will "figure out how to make it work".
She gave me a hug, and the conversation ended. Notified Dr. Palmer. Will check
in with pt in the morning.

## 2022-08-23 NOTE — NUR
Rn ummry:  Patient has been alert, aroues easily if sleeping, does own yankar
suction. Patient has been drinking fluids.  Pt medicated x 3 with 10 mg
Roxanol, atropine drops x2.  Pt medicated at 0630 with Ativan 1mg.  Wife is at
bedside.  Pt remains maybe a little better than when first transferred to [facility]
floor.  Repositioned for comfort. Serra cath with med orange urine, 375 cc's.

## 2022-08-24 NOTE — NUR
DEATH
 
PATIENT TIME OF DEATH . 2 RN VERIFIED. FAMILY AT BEDSIDE. DR. COTA
NOTIFIED. PREFERRED  HOME GIVEN TO CHARGE NURSE. FAMILY TOOK BELONGINGS
HOME.

## 2022-08-24 NOTE — NUR
SHIFT SUMMARY
COMFORT CARE. NONRESPONSIVE & NONVERBAL. UNABLE TO FOLLOW DIRECTIONS OR OPEN
EYES TO VERBAL STIMULI. PT HAS MOANED T/O NIGHT WITH LABOURED BREATHING &
PERIODS OF APNEA UP TO 10-12 SECONDS, MEDICATED 3X c 20MG ROXANOL, 2X c 2MG IV
MORPHINE & 2X c 1MG ATIVAN. TURNED & REPOSITIONED PER WIFE DECISION FOR
COMFORT, SHE HAS BEEN AT BEDSIDE T/O NIGHT. CALL LIGHT IN REACH.